# Patient Record
Sex: FEMALE | Race: WHITE | NOT HISPANIC OR LATINO | Employment: OTHER | URBAN - METROPOLITAN AREA
[De-identification: names, ages, dates, MRNs, and addresses within clinical notes are randomized per-mention and may not be internally consistent; named-entity substitution may affect disease eponyms.]

---

## 2024-07-06 ENCOUNTER — HOSPITAL ENCOUNTER (INPATIENT)
Facility: HOSPITAL | Age: 69
LOS: 3 days | Discharge: HOME | DRG: 280 | End: 2024-07-09
Attending: EMERGENCY MEDICINE | Admitting: EMERGENCY MEDICINE
Payer: MEDICARE

## 2024-07-06 DIAGNOSIS — I50.23 ACUTE ON CHRONIC SYSTOLIC CONGESTIVE HEART FAILURE: Primary | ICD-10-CM

## 2024-07-06 DIAGNOSIS — E11.65 TYPE 2 DIABETES MELLITUS WITH HYPERGLYCEMIA, UNSPECIFIED WHETHER LONG TERM INSULIN USE: ICD-10-CM

## 2024-07-06 DIAGNOSIS — E87.20 METABOLIC ACIDOSIS: ICD-10-CM

## 2024-07-06 DIAGNOSIS — R06.02 SOB (SHORTNESS OF BREATH): ICD-10-CM

## 2024-07-06 DIAGNOSIS — J96.01 ACUTE HYPOXEMIC RESPIRATORY FAILURE: ICD-10-CM

## 2024-07-06 DIAGNOSIS — N18.9 CHRONIC KIDNEY DISEASE, UNSPECIFIED CKD STAGE: ICD-10-CM

## 2024-07-06 DIAGNOSIS — R07.9 CHEST PAIN: ICD-10-CM

## 2024-07-06 LAB
ALBUMIN SERPL-MCNC: 3.4 G/DL (ref 3.4–4.8)
ALBUMIN/GLOB SERPL: 0.8 RATIO (ref 1.1–2)
ALLENS TEST: ABNORMAL
ALP SERPL-CCNC: 80 UNIT/L (ref 40–150)
ALT SERPL-CCNC: 19 UNIT/L (ref 0–55)
ANION GAP SERPL CALC-SCNC: 19 MEQ/L
AST SERPL-CCNC: 29 UNIT/L (ref 5–34)
BACTERIA #/AREA URNS AUTO: NORMAL /HPF
BASOPHILS # BLD AUTO: 0.12 X10(3)/MCL
BASOPHILS NFR BLD AUTO: 0.7 %
BILIRUB SERPL-MCNC: 0.3 MG/DL
BILIRUB UR QL STRIP.AUTO: NEGATIVE
BNP BLD-MCNC: 486.9 PG/ML
BUN SERPL-MCNC: 23 MG/DL (ref 9.8–20.1)
CALCIUM SERPL-MCNC: 9.6 MG/DL (ref 8.4–10.2)
CHLORIDE SERPL-SCNC: 103 MMOL/L (ref 98–107)
CLARITY UR: CLEAR
CO2 SERPL-SCNC: 19 MMOL/L (ref 23–31)
COLOR UR AUTO: YELLOW
CREAT SERPL-MCNC: 2.07 MG/DL (ref 0.55–1.02)
CREAT/UREA NIT SERPL: 11
D DIMER PPP IA.FEU-MCNC: 3.14 UG/ML FEU (ref 0–0.5)
DELSYS: ABNORMAL
EOSINOPHIL # BLD AUTO: 0.54 X10(3)/MCL (ref 0–0.9)
EOSINOPHIL NFR BLD AUTO: 3.1 %
EP: 6
ERYTHROCYTE [DISTWIDTH] IN BLOOD BY AUTOMATED COUNT: 12.7 % (ref 11.5–17)
ERYTHROCYTE [SEDIMENTATION RATE] IN BLOOD BY WESTERGREN METHOD: 16 MM/H
FIO2: 100
FLUAV AG UPPER RESP QL IA.RAPID: NOT DETECTED
FLUBV AG UPPER RESP QL IA.RAPID: NOT DETECTED
GFR SERPLBLD CREATININE-BSD FMLA CKD-EPI: 26 ML/MIN/1.73/M2
GLOBULIN SER-MCNC: 4.1 GM/DL (ref 2.4–3.5)
GLUCOSE SERPL-MCNC: 565 MG/DL (ref 82–115)
GLUCOSE UR QL STRIP: ABNORMAL
HCO3 UR-SCNC: 28.1 MMOL/L (ref 24–28)
HCT VFR BLD AUTO: 38.4 % (ref 37–47)
HGB BLD-MCNC: 12 G/DL (ref 12–16)
HGB UR QL STRIP: NEGATIVE
IMM GRANULOCYTES # BLD AUTO: 0.11 X10(3)/MCL (ref 0–0.04)
IMM GRANULOCYTES NFR BLD AUTO: 0.6 %
IP: 12
KETONES UR QL STRIP: NEGATIVE
LEUKOCYTE ESTERASE UR QL STRIP: NEGATIVE
LYMPHOCYTES # BLD AUTO: 7.42 X10(3)/MCL (ref 0.6–4.6)
LYMPHOCYTES NFR BLD AUTO: 43.1 %
MAGNESIUM SERPL-MCNC: 2.4 MG/DL (ref 1.6–2.6)
MCH RBC QN AUTO: 28.2 PG (ref 27–31)
MCHC RBC AUTO-ENTMCNC: 31.3 G/DL (ref 33–36)
MCV RBC AUTO: 90.1 FL (ref 80–94)
MODE: ABNORMAL
MONOCYTES # BLD AUTO: 1.26 X10(3)/MCL (ref 0.1–1.3)
MONOCYTES NFR BLD AUTO: 7.3 %
NEUTROPHILS # BLD AUTO: 7.76 X10(3)/MCL (ref 2.1–9.2)
NEUTROPHILS NFR BLD AUTO: 45.2 %
NITRITE UR QL STRIP: NEGATIVE
NRBC BLD AUTO-RTO: 0 %
PCO2 BLDA: 56.5 MMHG (ref 35–45)
PH SMN: 7.3 [PH] (ref 7.35–7.45)
PH UR STRIP: 5.5 [PH]
PLATELET # BLD AUTO: 423 X10(3)/MCL (ref 130–400)
PMV BLD AUTO: 10.5 FL (ref 7.4–10.4)
PO2 BLDA: 280 MMHG (ref 80–100)
POC BE: 2 MMOL/L
POC SATURATED O2: 100 % (ref 95–100)
POC TCO2: 30 MMOL/L (ref 23–27)
POCT GLUCOSE: 289 MG/DL (ref 70–110)
POTASSIUM SERPL-SCNC: 3.6 MMOL/L (ref 3.5–5.1)
PROT SERPL-MCNC: 7.5 GM/DL (ref 5.8–7.6)
PROT UR QL STRIP: ABNORMAL
RBC # BLD AUTO: 4.26 X10(6)/MCL (ref 4.2–5.4)
RBC #/AREA URNS AUTO: NORMAL /HPF
SAMPLE: ABNORMAL
SARS-COV-2 RNA RESP QL NAA+PROBE: NOT DETECTED
SITE: ABNORMAL
SODIUM SERPL-SCNC: 141 MMOL/L (ref 136–145)
SP GR UR STRIP.AUTO: 1.01 (ref 1–1.03)
SQUAMOUS #/AREA URNS AUTO: NORMAL /HPF
TROPONIN I SERPL-MCNC: 0.06 NG/ML (ref 0–0.04)
TROPONIN I SERPL-MCNC: 0.12 NG/ML (ref 0–0.04)
TSH SERPL-ACNC: 4.72 UIU/ML (ref 0.35–4.94)
UROBILINOGEN UR STRIP-ACNC: 0.2
WBC # BLD AUTO: 17.21 X10(3)/MCL (ref 4.5–11.5)
WBC #/AREA URNS AUTO: NORMAL /HPF

## 2024-07-06 PROCEDURE — 83735 ASSAY OF MAGNESIUM: CPT | Performed by: EMERGENCY MEDICINE

## 2024-07-06 PROCEDURE — 20000000 HC ICU ROOM

## 2024-07-06 PROCEDURE — 25000003 PHARM REV CODE 250: Performed by: INTERNAL MEDICINE

## 2024-07-06 PROCEDURE — 93010 ELECTROCARDIOGRAM REPORT: CPT | Mod: ,,, | Performed by: INTERNAL MEDICINE

## 2024-07-06 PROCEDURE — 85379 FIBRIN DEGRADATION QUANT: CPT | Performed by: EMERGENCY MEDICINE

## 2024-07-06 PROCEDURE — 93005 ELECTROCARDIOGRAM TRACING: CPT

## 2024-07-06 PROCEDURE — 5A09357 ASSISTANCE WITH RESPIRATORY VENTILATION, LESS THAN 24 CONSECUTIVE HOURS, CONTINUOUS POSITIVE AIRWAY PRESSURE: ICD-10-PCS | Performed by: EMERGENCY MEDICINE

## 2024-07-06 PROCEDURE — 36600 WITHDRAWAL OF ARTERIAL BLOOD: CPT

## 2024-07-06 PROCEDURE — 83880 ASSAY OF NATRIURETIC PEPTIDE: CPT | Performed by: EMERGENCY MEDICINE

## 2024-07-06 PROCEDURE — 27100171 HC OXYGEN HIGH FLOW UP TO 24 HOURS

## 2024-07-06 PROCEDURE — 82803 BLOOD GASES ANY COMBINATION: CPT

## 2024-07-06 PROCEDURE — 85025 COMPLETE CBC W/AUTO DIFF WBC: CPT | Performed by: EMERGENCY MEDICINE

## 2024-07-06 PROCEDURE — 25000003 PHARM REV CODE 250: Performed by: EMERGENCY MEDICINE

## 2024-07-06 PROCEDURE — 99900035 HC TECH TIME PER 15 MIN (STAT)

## 2024-07-06 PROCEDURE — 27000190 HC CPAP FULL FACE MASK W/VALVE

## 2024-07-06 PROCEDURE — 94761 N-INVAS EAR/PLS OXIMETRY MLT: CPT

## 2024-07-06 PROCEDURE — 81003 URINALYSIS AUTO W/O SCOPE: CPT | Performed by: EMERGENCY MEDICINE

## 2024-07-06 PROCEDURE — 0240U COVID/FLU A&B PCR: CPT | Performed by: EMERGENCY MEDICINE

## 2024-07-06 PROCEDURE — 84484 ASSAY OF TROPONIN QUANT: CPT | Performed by: EMERGENCY MEDICINE

## 2024-07-06 PROCEDURE — 99291 CRITICAL CARE FIRST HOUR: CPT | Mod: 25

## 2024-07-06 PROCEDURE — 80053 COMPREHEN METABOLIC PANEL: CPT | Performed by: EMERGENCY MEDICINE

## 2024-07-06 PROCEDURE — 84443 ASSAY THYROID STIM HORMONE: CPT | Performed by: EMERGENCY MEDICINE

## 2024-07-06 PROCEDURE — 81001 URINALYSIS AUTO W/SCOPE: CPT | Performed by: EMERGENCY MEDICINE

## 2024-07-06 PROCEDURE — 63600175 PHARM REV CODE 636 W HCPCS: Performed by: EMERGENCY MEDICINE

## 2024-07-06 PROCEDURE — 96374 THER/PROPH/DIAG INJ IV PUSH: CPT

## 2024-07-06 RX ORDER — ALPRAZOLAM 0.5 MG/1
0.5 TABLET ORAL 2 TIMES DAILY PRN
Status: DISCONTINUED | OUTPATIENT
Start: 2024-07-06 | End: 2024-07-09 | Stop reason: HOSPADM

## 2024-07-06 RX ORDER — FUROSEMIDE 10 MG/ML
40 INJECTION INTRAMUSCULAR; INTRAVENOUS EVERY 8 HOURS
Status: DISCONTINUED | OUTPATIENT
Start: 2024-07-07 | End: 2024-07-07

## 2024-07-06 RX ORDER — ACETAMINOPHEN 325 MG/1
650 TABLET ORAL EVERY 4 HOURS PRN
Status: DISCONTINUED | OUTPATIENT
Start: 2024-07-06 | End: 2024-07-09 | Stop reason: HOSPADM

## 2024-07-06 RX ORDER — GLIPIZIDE 5 MG/1
5 TABLET ORAL 2 TIMES DAILY
COMMUNITY
Start: 2024-05-07

## 2024-07-06 RX ORDER — ONDANSETRON HYDROCHLORIDE 2 MG/ML
4 INJECTION, SOLUTION INTRAVENOUS EVERY 8 HOURS PRN
Status: DISCONTINUED | OUTPATIENT
Start: 2024-07-06 | End: 2024-07-09 | Stop reason: HOSPADM

## 2024-07-06 RX ORDER — ZOLPIDEM TARTRATE 10 MG/1
10 TABLET ORAL NIGHTLY
COMMUNITY
Start: 2024-07-02

## 2024-07-06 RX ORDER — FENOFIBRATE 145 MG/1
145 TABLET, FILM COATED ORAL DAILY
Status: DISCONTINUED | OUTPATIENT
Start: 2024-07-07 | End: 2024-07-09 | Stop reason: HOSPADM

## 2024-07-06 RX ORDER — FUROSEMIDE 10 MG/ML
80 INJECTION INTRAMUSCULAR; INTRAVENOUS
Status: COMPLETED | OUTPATIENT
Start: 2024-07-06 | End: 2024-07-06

## 2024-07-06 RX ORDER — HYDRALAZINE HYDROCHLORIDE 25 MG/1
25 TABLET, FILM COATED ORAL 3 TIMES DAILY
Status: DISCONTINUED | OUTPATIENT
Start: 2024-07-07 | End: 2024-07-07

## 2024-07-06 RX ORDER — TALC
6 POWDER (GRAM) TOPICAL NIGHTLY PRN
Status: DISCONTINUED | OUTPATIENT
Start: 2024-07-06 | End: 2024-07-09 | Stop reason: HOSPADM

## 2024-07-06 RX ORDER — PANTOPRAZOLE SODIUM 40 MG/1
40 TABLET, DELAYED RELEASE ORAL 2 TIMES DAILY
COMMUNITY
Start: 2024-05-08

## 2024-07-06 RX ORDER — ONDANSETRON HYDROCHLORIDE 2 MG/ML
4 INJECTION, SOLUTION INTRAVENOUS EVERY 8 HOURS PRN
Status: DISCONTINUED | OUTPATIENT
Start: 2024-07-06 | End: 2024-07-07

## 2024-07-06 RX ORDER — VENLAFAXINE HYDROCHLORIDE 37.5 MG/1
37.5 CAPSULE, EXTENDED RELEASE ORAL DAILY
Status: DISCONTINUED | OUTPATIENT
Start: 2024-07-07 | End: 2024-07-09 | Stop reason: HOSPADM

## 2024-07-06 RX ORDER — INSULIN GLARGINE 100 [IU]/ML
INJECTION, SOLUTION SUBCUTANEOUS
COMMUNITY
Start: 2024-05-07

## 2024-07-06 RX ORDER — ALPRAZOLAM 0.5 MG/1
0.5 TABLET ORAL 2 TIMES DAILY PRN
COMMUNITY

## 2024-07-06 RX ORDER — FAMOTIDINE 20 MG/1
20 TABLET, FILM COATED ORAL EVERY 24 HOURS
Status: DISCONTINUED | OUTPATIENT
Start: 2024-07-07 | End: 2024-07-09 | Stop reason: HOSPADM

## 2024-07-06 RX ORDER — LISINOPRIL 20 MG/1
40 TABLET ORAL DAILY
Status: DISCONTINUED | OUTPATIENT
Start: 2024-07-07 | End: 2024-07-09 | Stop reason: HOSPADM

## 2024-07-06 RX ORDER — FENOFIBRATE 160 MG/1
160 TABLET ORAL NIGHTLY
COMMUNITY
Start: 2024-05-07

## 2024-07-06 RX ORDER — CLOTRIMAZOLE AND BETAMETHASONE DIPROPIONATE 10; .64 MG/G; MG/G
CREAM TOPICAL
COMMUNITY

## 2024-07-06 RX ORDER — LISINOPRIL 40 MG/1
40 TABLET ORAL DAILY
COMMUNITY

## 2024-07-06 RX ORDER — SODIUM CHLORIDE 0.9 % (FLUSH) 0.9 %
10 SYRINGE (ML) INJECTION
Status: DISCONTINUED | OUTPATIENT
Start: 2024-07-06 | End: 2024-07-07

## 2024-07-06 RX ORDER — GABAPENTIN 300 MG/1
600 CAPSULE ORAL 2 TIMES DAILY
Status: DISCONTINUED | OUTPATIENT
Start: 2024-07-06 | End: 2024-07-09 | Stop reason: HOSPADM

## 2024-07-06 RX ORDER — ALBUTEROL SULFATE 90 UG/1
2 AEROSOL, METERED RESPIRATORY (INHALATION) EVERY 4 HOURS PRN
COMMUNITY
Start: 2024-06-11

## 2024-07-06 RX ORDER — HYDRALAZINE HYDROCHLORIDE 25 MG/1
25 TABLET, FILM COATED ORAL 3 TIMES DAILY
COMMUNITY
Start: 2024-06-25

## 2024-07-06 RX ORDER — GLIPIZIDE 5 MG/1
5 TABLET ORAL 2 TIMES DAILY
Status: DISCONTINUED | OUTPATIENT
Start: 2024-07-06 | End: 2024-07-07

## 2024-07-06 RX ORDER — SODIUM CHLORIDE 0.9 % (FLUSH) 0.9 %
10 SYRINGE (ML) INJECTION EVERY 12 HOURS PRN
Status: DISCONTINUED | OUTPATIENT
Start: 2024-07-06 | End: 2024-07-09 | Stop reason: HOSPADM

## 2024-07-06 RX ORDER — DEXTROSE MONOHYDRATE 100 MG/ML
INJECTION, SOLUTION INTRAVENOUS
Status: DISCONTINUED | OUTPATIENT
Start: 2024-07-06 | End: 2024-07-09 | Stop reason: HOSPADM

## 2024-07-06 RX ORDER — AMLODIPINE BESYLATE 10 MG/1
10 TABLET ORAL DAILY
COMMUNITY
Start: 2024-06-22

## 2024-07-06 RX ORDER — FAMOTIDINE 20 MG/1
20 TABLET, FILM COATED ORAL 2 TIMES DAILY
COMMUNITY
Start: 2024-05-07

## 2024-07-06 RX ORDER — INSULIN ASPART 100 [IU]/ML
0-10 INJECTION, SOLUTION INTRAVENOUS; SUBCUTANEOUS
Status: DISCONTINUED | OUTPATIENT
Start: 2024-07-06 | End: 2024-07-07

## 2024-07-06 RX ORDER — GLUCAGON 1 MG
1 KIT INJECTION
Status: DISCONTINUED | OUTPATIENT
Start: 2024-07-06 | End: 2024-07-09 | Stop reason: HOSPADM

## 2024-07-06 RX ORDER — CARVEDILOL 25 MG/1
25 TABLET ORAL 2 TIMES DAILY
Status: DISCONTINUED | OUTPATIENT
Start: 2024-07-06 | End: 2024-07-09 | Stop reason: HOSPADM

## 2024-07-06 RX ORDER — ALLOPURINOL 100 MG/1
100 TABLET ORAL DAILY
Status: DISCONTINUED | OUTPATIENT
Start: 2024-07-07 | End: 2024-07-09 | Stop reason: HOSPADM

## 2024-07-06 RX ORDER — ATORVASTATIN CALCIUM 40 MG/1
40 TABLET, FILM COATED ORAL DAILY
Status: DISCONTINUED | OUTPATIENT
Start: 2024-07-07 | End: 2024-07-09 | Stop reason: HOSPADM

## 2024-07-06 RX ORDER — GABAPENTIN 600 MG/1
600 TABLET ORAL 3 TIMES DAILY
COMMUNITY
Start: 2024-05-07

## 2024-07-06 RX ORDER — ASPIRIN 81 MG/1
81 TABLET ORAL DAILY
Status: DISCONTINUED | OUTPATIENT
Start: 2024-07-07 | End: 2024-07-09 | Stop reason: HOSPADM

## 2024-07-06 RX ORDER — AMLODIPINE BESYLATE 10 MG/1
10 TABLET ORAL DAILY
Status: DISCONTINUED | OUTPATIENT
Start: 2024-07-07 | End: 2024-07-07

## 2024-07-06 RX ORDER — NALOXONE HCL 0.4 MG/ML
0.02 VIAL (ML) INJECTION
Status: DISCONTINUED | OUTPATIENT
Start: 2024-07-06 | End: 2024-07-09 | Stop reason: HOSPADM

## 2024-07-06 RX ORDER — CARVEDILOL 25 MG/1
25 TABLET ORAL 2 TIMES DAILY
COMMUNITY

## 2024-07-06 RX ORDER — ASPIRIN 81 MG/1
1 TABLET ORAL DAILY
COMMUNITY

## 2024-07-06 RX ORDER — METOCLOPRAMIDE HYDROCHLORIDE 5 MG/ML
10 INJECTION INTRAMUSCULAR; INTRAVENOUS
Status: DISCONTINUED | OUTPATIENT
Start: 2024-07-06 | End: 2024-07-06

## 2024-07-06 RX ORDER — ISOSORBIDE MONONITRATE 30 MG/1
30 TABLET, EXTENDED RELEASE ORAL
COMMUNITY
Start: 2024-05-07

## 2024-07-06 RX ORDER — SIMVASTATIN 20 MG/1
20 TABLET, FILM COATED ORAL DAILY
COMMUNITY

## 2024-07-06 RX ORDER — IBUPROFEN 200 MG
16 TABLET ORAL
Status: DISCONTINUED | OUTPATIENT
Start: 2024-07-06 | End: 2024-07-09 | Stop reason: HOSPADM

## 2024-07-06 RX ORDER — ENOXAPARIN SODIUM 100 MG/ML
30 INJECTION SUBCUTANEOUS EVERY 24 HOURS
Status: DISCONTINUED | OUTPATIENT
Start: 2024-07-06 | End: 2024-07-09 | Stop reason: HOSPADM

## 2024-07-06 RX ORDER — ISOPROPYL ALCOHOL 0.75 G/1
1 SWAB TOPICAL DAILY
COMMUNITY
Start: 2024-05-07

## 2024-07-06 RX ORDER — AMOXICILLIN 250 MG
1 CAPSULE ORAL 2 TIMES DAILY
Status: DISCONTINUED | OUTPATIENT
Start: 2024-07-06 | End: 2024-07-09 | Stop reason: HOSPADM

## 2024-07-06 RX ORDER — TRAMADOL HYDROCHLORIDE 50 MG/1
50 TABLET ORAL DAILY PRN
COMMUNITY
Start: 2024-07-02

## 2024-07-06 RX ORDER — ISOSORBIDE MONONITRATE 30 MG/1
30 TABLET, EXTENDED RELEASE ORAL DAILY
Status: DISCONTINUED | OUTPATIENT
Start: 2024-07-07 | End: 2024-07-09 | Stop reason: HOSPADM

## 2024-07-06 RX ORDER — VENLAFAXINE 37.5 MG/1
37.5 TABLET ORAL 2 TIMES DAILY
COMMUNITY

## 2024-07-06 RX ORDER — FUROSEMIDE 20 MG/1
20-40 TABLET ORAL DAILY PRN
COMMUNITY
Start: 2024-07-02

## 2024-07-06 RX ORDER — HYDROCODONE BITARTRATE AND ACETAMINOPHEN 5; 325 MG/1; MG/1
1 TABLET ORAL EVERY 6 HOURS PRN
Status: DISCONTINUED | OUTPATIENT
Start: 2024-07-06 | End: 2024-07-09 | Stop reason: HOSPADM

## 2024-07-06 RX ORDER — ENOXAPARIN SODIUM 100 MG/ML
40 INJECTION SUBCUTANEOUS EVERY 24 HOURS
Status: DISCONTINUED | OUTPATIENT
Start: 2024-07-06 | End: 2024-07-06

## 2024-07-06 RX ORDER — ALLOPURINOL 100 MG/1
100 TABLET ORAL DAILY
COMMUNITY
Start: 2024-06-22

## 2024-07-06 RX ORDER — MELOXICAM 15 MG/1
15 TABLET ORAL DAILY
COMMUNITY
Start: 2024-05-08

## 2024-07-06 RX ORDER — MUPIROCIN 20 MG/G
OINTMENT TOPICAL 2 TIMES DAILY
Status: DISCONTINUED | OUTPATIENT
Start: 2024-07-07 | End: 2024-07-09 | Stop reason: HOSPADM

## 2024-07-06 RX ADMIN — CARVEDILOL 25 MG: 25 TABLET, FILM COATED ORAL at 11:07

## 2024-07-06 RX ADMIN — GABAPENTIN 600 MG: 300 CAPSULE ORAL at 11:07

## 2024-07-06 RX ADMIN — INSULIN HUMAN 0.1 UNITS/KG/HR: 1 INJECTION, SOLUTION INTRAVENOUS at 10:07

## 2024-07-06 RX ADMIN — FUROSEMIDE 80 MG: 10 INJECTION, SOLUTION INTRAMUSCULAR; INTRAVENOUS at 08:07

## 2024-07-06 RX ADMIN — NITROGLYCERIN 1 INCH: 20 OINTMENT TOPICAL at 08:07

## 2024-07-06 NOTE — Clinical Note
Diagnosis: Acute on chronic systolic congestive heart failure [912606]   Future Attending Provider: CYNTHIA GIANG [[id number]]   Reason for IP Medical Treatment  (Clinical interventions that can only be accomplished in the IP setting? ) :: Acute respiratory failure with hypoxemia   I certify that Inpatient services for greater than or equal to 2 midnights are medically necessary:: Yes   Plans for Post-Acute care--if anticipated (pick the single best option):: A. No post acute care anticipated at this time   Special Needs:: No Special Needs [1]

## 2024-07-07 PROBLEM — I50.23 ACUTE ON CHRONIC SYSTOLIC CONGESTIVE HEART FAILURE: Status: ACTIVE | Noted: 2024-07-07

## 2024-07-07 LAB
ALBUMIN SERPL-MCNC: 2.8 G/DL (ref 3.4–4.8)
ALBUMIN/GLOB SERPL: 0.9 RATIO (ref 1.1–2)
ALP SERPL-CCNC: 67 UNIT/L (ref 40–150)
ALT SERPL-CCNC: 15 UNIT/L (ref 0–55)
ANION GAP SERPL CALC-SCNC: 12 MEQ/L
ANION GAP SERPL CALC-SCNC: 8 MEQ/L
AST SERPL-CCNC: 21 UNIT/L (ref 5–34)
BASOPHILS # BLD AUTO: 0.03 X10(3)/MCL
BASOPHILS NFR BLD AUTO: 0.3 %
BILIRUB SERPL-MCNC: 0.3 MG/DL
BUN SERPL-MCNC: 25 MG/DL (ref 9.8–20.1)
BUN SERPL-MCNC: 28 MG/DL (ref 9.8–20.1)
CALCIUM SERPL-MCNC: 9.3 MG/DL (ref 8.4–10.2)
CALCIUM SERPL-MCNC: 9.6 MG/DL (ref 8.4–10.2)
CHLORIDE SERPL-SCNC: 104 MMOL/L (ref 98–107)
CHLORIDE SERPL-SCNC: 105 MMOL/L (ref 98–107)
CO2 SERPL-SCNC: 27 MMOL/L (ref 23–31)
CO2 SERPL-SCNC: 31 MMOL/L (ref 23–31)
CREAT SERPL-MCNC: 1.69 MG/DL (ref 0.55–1.02)
CREAT SERPL-MCNC: 1.73 MG/DL (ref 0.55–1.02)
CREAT/UREA NIT SERPL: 14
CREAT/UREA NIT SERPL: 17
EOSINOPHIL # BLD AUTO: 0.16 X10(3)/MCL (ref 0–0.9)
EOSINOPHIL NFR BLD AUTO: 1.8 %
ERYTHROCYTE [DISTWIDTH] IN BLOOD BY AUTOMATED COUNT: 12.5 % (ref 11.5–17)
GFR SERPLBLD CREATININE-BSD FMLA CKD-EPI: 32 ML/MIN/1.73/M2
GFR SERPLBLD CREATININE-BSD FMLA CKD-EPI: 33 ML/MIN/1.73/M2
GLOBULIN SER-MCNC: 3 GM/DL (ref 2.4–3.5)
GLUCOSE SERPL-MCNC: 154 MG/DL (ref 82–115)
GLUCOSE SERPL-MCNC: 199 MG/DL (ref 82–115)
HCT VFR BLD AUTO: 29.9 % (ref 37–47)
HGB BLD-MCNC: 9.5 G/DL (ref 12–16)
IMM GRANULOCYTES # BLD AUTO: 0.03 X10(3)/MCL (ref 0–0.04)
IMM GRANULOCYTES NFR BLD AUTO: 0.3 %
LYMPHOCYTES # BLD AUTO: 1.84 X10(3)/MCL (ref 0.6–4.6)
LYMPHOCYTES NFR BLD AUTO: 20.8 %
MAGNESIUM SERPL-MCNC: 1.9 MG/DL (ref 1.6–2.6)
MCH RBC QN AUTO: 28 PG (ref 27–31)
MCHC RBC AUTO-ENTMCNC: 31.8 G/DL (ref 33–36)
MCV RBC AUTO: 88.2 FL (ref 80–94)
MONOCYTES # BLD AUTO: 0.5 X10(3)/MCL (ref 0.1–1.3)
MONOCYTES NFR BLD AUTO: 5.6 %
NEUTROPHILS # BLD AUTO: 6.3 X10(3)/MCL (ref 2.1–9.2)
NEUTROPHILS NFR BLD AUTO: 71.2 %
OHS QRS DURATION: 134 MS
OHS QRS DURATION: 140 MS
OHS QTC CALCULATION: 544 MS
OHS QTC CALCULATION: 549 MS
PLATELET # BLD AUTO: 254 X10(3)/MCL (ref 130–400)
PMV BLD AUTO: 9.7 FL (ref 7.4–10.4)
POCT GLUCOSE: 115 MG/DL (ref 70–110)
POCT GLUCOSE: 125 MG/DL (ref 70–110)
POCT GLUCOSE: 126 MG/DL (ref 70–110)
POCT GLUCOSE: 139 MG/DL (ref 70–110)
POCT GLUCOSE: 147 MG/DL (ref 70–110)
POCT GLUCOSE: 159 MG/DL (ref 70–110)
POCT GLUCOSE: 159 MG/DL (ref 70–110)
POCT GLUCOSE: 165 MG/DL (ref 70–110)
POCT GLUCOSE: 175 MG/DL (ref 70–110)
POTASSIUM SERPL-SCNC: 3.5 MMOL/L (ref 3.5–5.1)
POTASSIUM SERPL-SCNC: 3.6 MMOL/L (ref 3.5–5.1)
PROT SERPL-MCNC: 5.8 GM/DL (ref 5.8–7.6)
RBC # BLD AUTO: 3.39 X10(6)/MCL (ref 4.2–5.4)
SODIUM SERPL-SCNC: 143 MMOL/L (ref 136–145)
SODIUM SERPL-SCNC: 144 MMOL/L (ref 136–145)
TROPONIN I SERPL-MCNC: 0.16 NG/ML (ref 0–0.04)
TROPONIN I SERPL-MCNC: 0.23 NG/ML (ref 0–0.04)
WBC # BLD AUTO: 8.86 X10(3)/MCL (ref 4.5–11.5)

## 2024-07-07 PROCEDURE — 99900035 HC TECH TIME PER 15 MIN (STAT)

## 2024-07-07 PROCEDURE — 11000001 HC ACUTE MED/SURG PRIVATE ROOM

## 2024-07-07 PROCEDURE — 21400001 HC TELEMETRY ROOM

## 2024-07-07 PROCEDURE — 80048 BASIC METABOLIC PNL TOTAL CA: CPT | Performed by: INTERNAL MEDICINE

## 2024-07-07 PROCEDURE — 25000003 PHARM REV CODE 250: Performed by: INTERNAL MEDICINE

## 2024-07-07 PROCEDURE — 94761 N-INVAS EAR/PLS OXIMETRY MLT: CPT

## 2024-07-07 PROCEDURE — 80053 COMPREHEN METABOLIC PANEL: CPT | Performed by: INTERNAL MEDICINE

## 2024-07-07 PROCEDURE — 94660 CPAP INITIATION&MGMT: CPT

## 2024-07-07 PROCEDURE — 63600175 PHARM REV CODE 636 W HCPCS: Performed by: EMERGENCY MEDICINE

## 2024-07-07 PROCEDURE — 63600175 PHARM REV CODE 636 W HCPCS: Performed by: INTERNAL MEDICINE

## 2024-07-07 PROCEDURE — 27000190 HC CPAP FULL FACE MASK W/VALVE

## 2024-07-07 PROCEDURE — 25000003 PHARM REV CODE 250: Performed by: EMERGENCY MEDICINE

## 2024-07-07 PROCEDURE — 84484 ASSAY OF TROPONIN QUANT: CPT | Performed by: INTERNAL MEDICINE

## 2024-07-07 PROCEDURE — 85025 COMPLETE CBC W/AUTO DIFF WBC: CPT | Performed by: INTERNAL MEDICINE

## 2024-07-07 PROCEDURE — 83735 ASSAY OF MAGNESIUM: CPT | Performed by: INTERNAL MEDICINE

## 2024-07-07 PROCEDURE — 36415 COLL VENOUS BLD VENIPUNCTURE: CPT | Performed by: INTERNAL MEDICINE

## 2024-07-07 PROCEDURE — 27100171 HC OXYGEN HIGH FLOW UP TO 24 HOURS

## 2024-07-07 RX ORDER — INSULIN GLARGINE 100 [IU]/ML
40 INJECTION, SOLUTION SUBCUTANEOUS ONCE
Status: COMPLETED | OUTPATIENT
Start: 2024-07-07 | End: 2024-07-07

## 2024-07-07 RX ORDER — INSULIN ASPART 100 [IU]/ML
0-10 INJECTION, SOLUTION INTRAVENOUS; SUBCUTANEOUS EVERY 4 HOURS
Status: DISCONTINUED | OUTPATIENT
Start: 2024-07-07 | End: 2024-07-07

## 2024-07-07 RX ORDER — INSULIN ASPART 100 [IU]/ML
0-10 INJECTION, SOLUTION INTRAVENOUS; SUBCUTANEOUS EVERY 4 HOURS PRN
Status: DISCONTINUED | OUTPATIENT
Start: 2024-07-07 | End: 2024-07-09 | Stop reason: HOSPADM

## 2024-07-07 RX ORDER — DEXTROSE MONOHYDRATE, SODIUM CHLORIDE, AND POTASSIUM CHLORIDE 50; 1.49; 4.5 G/1000ML; G/1000ML; G/1000ML
INJECTION, SOLUTION INTRAVENOUS CONTINUOUS
Status: DISCONTINUED | OUTPATIENT
Start: 2024-07-07 | End: 2024-07-07

## 2024-07-07 RX ORDER — DAPAGLIFLOZIN 10 MG/1
10 TABLET, FILM COATED ORAL DAILY
Status: DISCONTINUED | OUTPATIENT
Start: 2024-07-07 | End: 2024-07-09 | Stop reason: HOSPADM

## 2024-07-07 RX ORDER — TIZANIDINE 4 MG/1
4 TABLET ORAL ONCE
Status: COMPLETED | OUTPATIENT
Start: 2024-07-07 | End: 2024-07-07

## 2024-07-07 RX ADMIN — SENNOSIDES AND DOCUSATE SODIUM 1 TABLET: 50; 8.6 TABLET ORAL at 09:07

## 2024-07-07 RX ADMIN — LISINOPRIL 40 MG: 20 TABLET ORAL at 09:07

## 2024-07-07 RX ADMIN — POTASSIUM CHLORIDE, DEXTROSE MONOHYDRATE AND SODIUM CHLORIDE: 150; 5; 450 INJECTION, SOLUTION INTRAVENOUS at 01:07

## 2024-07-07 RX ADMIN — DAPAGLIFLOZIN 10 MG: 10 TABLET, FILM COATED ORAL at 09:07

## 2024-07-07 RX ADMIN — ENOXAPARIN SODIUM 30 MG: 30 INJECTION SUBCUTANEOUS at 01:07

## 2024-07-07 RX ADMIN — MUPIROCIN 1 G: 20 OINTMENT TOPICAL at 09:07

## 2024-07-07 RX ADMIN — INSULIN ASPART 1 UNITS: 100 INJECTION, SOLUTION INTRAVENOUS; SUBCUTANEOUS at 09:07

## 2024-07-07 RX ADMIN — GABAPENTIN 600 MG: 300 CAPSULE ORAL at 09:07

## 2024-07-07 RX ADMIN — ALLOPURINOL 100 MG: 100 TABLET ORAL at 09:07

## 2024-07-07 RX ADMIN — INSULIN GLARGINE 40 UNITS: 100 INJECTION, SOLUTION SUBCUTANEOUS at 09:07

## 2024-07-07 RX ADMIN — ASPIRIN 81 MG: 81 TABLET, COATED ORAL at 09:07

## 2024-07-07 RX ADMIN — VENLAFAXINE HYDROCHLORIDE 37.5 MG: 37.5 CAPSULE, EXTENDED RELEASE ORAL at 09:07

## 2024-07-07 RX ADMIN — TIZANIDINE 4 MG: 4 TABLET ORAL at 01:07

## 2024-07-07 RX ADMIN — NITROGLYCERIN 1 INCH: 20 OINTMENT TOPICAL at 01:07

## 2024-07-07 RX ADMIN — ISOSORBIDE MONONITRATE 30 MG: 30 TABLET, EXTENDED RELEASE ORAL at 09:07

## 2024-07-07 RX ADMIN — CARVEDILOL 25 MG: 25 TABLET, FILM COATED ORAL at 09:07

## 2024-07-07 RX ADMIN — ATORVASTATIN CALCIUM 40 MG: 40 TABLET, FILM COATED ORAL at 09:07

## 2024-07-07 RX ADMIN — ENOXAPARIN SODIUM 30 MG: 30 INJECTION SUBCUTANEOUS at 04:07

## 2024-07-07 RX ADMIN — FUROSEMIDE 40 MG: 10 INJECTION, SOLUTION INTRAMUSCULAR; INTRAVENOUS at 05:07

## 2024-07-07 RX ADMIN — FENOFIBRATE 145 MG: 145 TABLET, COATED ORAL at 09:07

## 2024-07-07 RX ADMIN — FAMOTIDINE 20 MG: 20 TABLET ORAL at 01:07

## 2024-07-07 NOTE — ED PROVIDER NOTES
Encounter Date: 7/6/2024       History     Chief Complaint   Patient presents with    Shortness of Breath     Began 2 days ago with SOB and leg swelling     The history is provided by the patient and the EMS personnel.   Shortness of Breath  This is a new problem. The average episode lasts 2 days. The problem occurs continuously.The current episode started 2 days ago. The problem has been gradually worsening. Associated symptoms include orthopnea and leg swelling. Pertinent negatives include no fever, no sore throat, no cough, no chest pain and no rash. Associated medical issues include CAD and heart failure.     Review of patient's allergies indicates:   Allergen Reactions    Procaine      Other Reaction(s): Unknown    Silk Hives     Other Reaction(s): Not available, Not available     Past Medical History:   Diagnosis Date    CHF (congestive heart failure)     COPD (chronic obstructive pulmonary disease)     Diabetes mellitus     Gout, unspecified     Mixed hyperlipidemia     Sleep apnea, unspecified      No past surgical history on file.  No family history on file.     Review of Systems   Constitutional:  Negative for fever.   HENT:  Negative for sore throat.    Respiratory:  Positive for shortness of breath. Negative for cough.    Cardiovascular:  Positive for orthopnea and leg swelling. Negative for chest pain.   Gastrointestinal:  Negative for nausea.   Genitourinary:  Negative for dysuria.   Musculoskeletal:  Negative for back pain.   Skin:  Negative for rash.   Neurological:  Negative for weakness.   Hematological:  Does not bruise/bleed easily.       Physical Exam     Initial Vitals [07/06/24 2013]   BP Pulse Resp Temp SpO2   (!) 154/97 (!) 137 (!) 42 97.7 °F (36.5 °C) (!) 68 %      MAP       --         Physical Exam    Nursing note and vitals reviewed.  Constitutional: She appears well-developed and well-nourished. She appears distressed (anxious, screaming for help).   HENT:   Head: Normocephalic and  atraumatic.   Right Ear: External ear normal.   Left Ear: External ear normal.   Eyes: Conjunctivae and EOM are normal. Pupils are equal, round, and reactive to light.   Neck: Neck supple.   Normal range of motion.  Cardiovascular:  Regular rhythm, normal heart sounds and intact distal pulses.   Tachycardia present.         Pulmonary/Chest: Tachypnea noted. She is in respiratory distress. She has rales in the right middle field, the right lower field, the left middle field and the left lower field.   Abdominal: Abdomen is soft. Bowel sounds are normal.   obese   Musculoskeletal:         General: Normal range of motion.      Cervical back: Normal range of motion and neck supple.      Right lower le+ Pitting Edema present.      Left lower le+ Pitting Edema present.     Neurological: She is alert and oriented to person, place, and time. GCS score is 15. GCS eye subscore is 4. GCS verbal subscore is 5. GCS motor subscore is 6.   Skin: Skin is warm and dry. Capillary refill takes less than 2 seconds.   Psychiatric: Her behavior is normal. Judgment and thought content normal. Her mood appears anxious.         ED Course   Critical Care    Date/Time: 2024 8:10 PM    Performed by: Fidencio Sánchez MD  Authorized by: Fidencio Sánchez MD  Direct patient critical care time: 26 minutes  Additional history critical care time: 1 minutes  Ordering / reviewing critical care time: 7 minutes  Documentation critical care time: 12 minutes  Consulting other physicians critical care time: 4 minutes  Total critical care time (exclusive of procedural time) : 50 minutes  Critical care was necessary to treat or prevent imminent or life-threatening deterioration of the following conditions: respiratory failure, circulatory failure, metabolic crisis, renal failure and cardiac failure.  Critical care was time spent personally by me on the following activities: development of treatment plan with patient or surrogate,  interpretation of cardiac output measurements, evaluation of patient's response to treatment, examination of patient, obtaining history from patient or surrogate, ordering and performing treatments and interventions, ordering and review of radiographic studies, ordering and review of laboratory studies, pulse oximetry, re-evaluation of patient's condition, review of old charts and discussions with consultants.        Labs Reviewed   B-TYPE NATRIURETIC PEPTIDE - Abnormal; Notable for the following components:       Result Value    Natriuretic Peptide 486.9 (*)     All other components within normal limits   COMPREHENSIVE METABOLIC PANEL - Abnormal; Notable for the following components:    CO2 19 (*)     Glucose 565 (*)     Blood Urea Nitrogen 23.0 (*)     Creatinine 2.07 (*)     Globulin 4.1 (*)     Albumin/Globulin Ratio 0.8 (*)     All other components within normal limits   D DIMER, QUANTITATIVE - Abnormal; Notable for the following components:    D-Dimer 3.14 (*)     All other components within normal limits   TROPONIN I - Abnormal; Notable for the following components:    Troponin-I 0.056 (*)     All other components within normal limits   URINALYSIS, REFLEX TO URINE CULTURE - Abnormal; Notable for the following components:    Protein, UA 2+ (*)     Glucose, UA 3+ (*)     All other components within normal limits   CBC WITH DIFFERENTIAL - Abnormal; Notable for the following components:    WBC 17.21 (*)     MCHC 31.3 (*)     Platelet 423 (*)     MPV 10.5 (*)     Lymph # 7.42 (*)     IG# 0.11 (*)     All other components within normal limits   ISTAT PROCEDURE - Abnormal; Notable for the following components:    POC PH 7.305 (*)     POC PCO2 56.5 (*)     POC PO2 280 (*)     POC HCO3 28.1 (*)     POC TCO2 30 (*)     All other components within normal limits   COVID/FLU A&B PCR - Normal    Narrative:     The Xpert Xpress SARS-CoV-2/FLU/RSV plus is a rapid, multiplexed real-time PCR test intended for the simultaneous  qualitative detection and differentiation of SARS-CoV-2, Influenza A, Influenza B, and respiratory syncytial virus (RSV) viral RNA in either nasopharyngeal swab or nasal swab specimens.         MAGNESIUM - Normal   TSH - Normal   URINALYSIS, MICROSCOPIC - Normal   CBC W/ AUTO DIFFERENTIAL    Narrative:     The following orders were created for panel order CBC auto differential.  Procedure                               Abnormality         Status                     ---------                               -----------         ------                     CBC with Differential[[id number]]       Abnormal            Final result                 Please view results for these tests on the individual orders.     EKG Readings: (Independently Interpreted)   Initial Reading: No STEMI. DO NOT USE Rhythm: undetermined. Heart Rate: 133. Ectopy: PVCs. Conduction: LBBB. ST Segments: Non-Specific ST Segment Depression. ST Segment Depression: II, III and AVF. T Waves Flipped: II, III and AVF. [facility] ED EKG2 - Other Findings: voltage criteria for LVH with QRS widening. Clinical Impression: Left Ventricular Hypertrophy (LDH) and Movement Artifact with LBBB and with PVCs Other Impression: motion artifact limits diagnostic sensitivity; indeterminate rhythm - suspect AF with RVR       Imaging Results              X-Ray Chest AP Portable (Preliminary result)  Result time 07/06/24 20:29:48      Wet Read by Fidencio Sánchez MD (07/06/24 20:29:48, [facility] - Emergency Dept, Emergency Medicine)    CHF                                     Medications   furosemide injection 80 mg (80 mg Intravenous Given 7/6/24 2011)   nitroGLYCERIN 2% TD oint ointment 1 inch (1 inch Topical (Top) Given 7/6/24 2013)     Medical Decision Making  Amount and/or Complexity of Data Reviewed  Labs: ordered. Decision-making details documented in ED Course.  Radiology: ordered and independent interpretation performed. Decision-making details documented in  ED Course.  ECG/medicine tests: ordered and independent interpretation performed. Decision-making details documented in ED Course.    Risk  Prescription drug management.    Differential includes:  CHF, pleural effusion, infectious process, PE, myocardial ischemia, anemia, anxiety.  Will obtain CBC, CMP, BNP, troponin, mag, UA, D-dimer , CXR, EKG and give furosemide and topical NTG.  Will start BiPAP as she is markedly hypoxemic.           ED Course as of 07/06/24 2207   Sat Jul 06, 2024 2113 D-dimer elevated but renal function will not allow CTA.  Given suspected A-fib, will anticoagulate and plan on V/Q either tomorrow or Monday.  Patient with hyperglycemia and widened anion gap, concerning for DKA.  ABG ordered.  Must be cautious with IVF as she appears to volume overloaded already. [CL]   2147 Repeat EKG reveals rate of 105; there are now definite P waves visible with frequent and sometimes consecutive PVC's; LBBB remains which is chronic, per patient. [CL]   2200 I spoke with Dr. Khanna ([facility] medicine) - agrees with insulin gtt and ICU admission for continued diuresis and weaning of BiPAP [CL]      ED Course User Index  [CL] Fidencio Sánchez MD                             Clinical Impression:  Final diagnoses:  [R06.02] SOB (shortness of breath)  [I50.23] Acute on chronic systolic congestive heart failure (Primary)  [J96.01] Acute hypoxemic respiratory failure  [N18.9] Chronic kidney disease, unspecified CKD stage  [E11.65] Type 2 diabetes mellitus with hyperglycemia, unspecified whether long term insulin use  [E87.20] Metabolic acidosis          ED Disposition Condition    Admit Stable                Fidencio Sánchez MD  07/06/24 2207

## 2024-07-07 NOTE — PLAN OF CARE
Problem: Adult Inpatient Plan of Care  Goal: Plan of Care Review  Outcome: Progressing  Goal: Patient-Specific Goal (Individualized)  Outcome: Progressing  Goal: Absence of Hospital-Acquired Illness or Injury  Outcome: Progressing  Goal: Optimal Comfort and Wellbeing  Outcome: Progressing  Goal: Readiness for Transition of Care  Outcome: Progressing     Problem: Skin Injury Risk Increased  Goal: Skin Health and Integrity  Outcome: Progressing     Problem: Bariatric Environmental Safety  Goal: Safety Maintained with Care  Outcome: Progressing     Problem: Comorbidity Management  Goal: Maintenance of COPD Symptom Control  Outcome: Progressing  Goal: Maintenance of Heart Failure Symptom Control  Outcome: Progressing  Goal: Blood Pressure in Desired Range  Outcome: Progressing     Problem: Gas Exchange Impaired  Goal: Optimal Gas Exchange  Outcome: Progressing     Problem: Diabetes Comorbidity  Goal: Blood Glucose Level Within Targeted Range  Outcome: Progressing     Problem: Heart Failure  Goal: Optimal Coping  Outcome: Progressing  Goal: Optimal Cardiac Output  Outcome: Progressing  Goal: Stable Heart Rate and Rhythm  Outcome: Progressing  Goal: Optimal Functional Ability  Outcome: Progressing  Goal: Fluid and Electrolyte Balance  Outcome: Progressing  Goal: Improved Oral Intake  Outcome: Progressing  Goal: Effective Oxygenation and Ventilation  Outcome: Progressing  Goal: Effective Breathing Pattern During Sleep  Outcome: Progressing     Problem: Chronic Kidney Disease  Goal: Optimal Coping with Chronic Illness  Outcome: Progressing  Goal: Electrolyte Balance  Outcome: Progressing  Goal: Fluid Balance  Outcome: Progressing  Goal: Optimal Functional Ability  Outcome: Progressing  Goal: Absence of Anemia Signs and Symptoms  Outcome: Progressing  Goal: Optimal Oral Intake  Outcome: Progressing  Goal: Acceptable Pain Control  Outcome: Progressing  Goal: Minimize Renal Failure Effects  Outcome: Progressing     Problem:  Diabetic Ketoacidosis  Goal: Optimal Coping  Outcome: Progressing  Goal: Fluid and Electrolyte Balance with Absence of Ketosis  Outcome: Progressing

## 2024-07-07 NOTE — PROGRESS NOTES
[facility] - ICU Hospital Medicine  Progress Note    Patient Name: Keara Berry  MRN: [id number]  Patient Class: IP- Inpatient   Admission Date: 7/6/2024  Length of Stay: 1 days  Attending Physician: Perfecto Khanna Jr., MD  Primary Care Provider: Cheryl Provider        Subjective:     Principal Problem:Acute on chronic systolic congestive heart failure    Interval History:   HPI: Ms Berry presented to the emergency room with significant respiratory distress. She has a history of sleep apnea on CPAP, congestive heart failure, COPD. She is not on home oxygen. Upon arrival to ER she was in significant respiratory distress and was placed on BiPAP. Workup revealed volume overload and she was given IV furosemide and has already diuresed 700 mL. At time of exam she was on BiPAP 12/6, 50% FiO2. She denies any chest pain or nausea. She does not smoke. Initial ABG done while in ER does show chronic respiratory acidosis and a secondary metabolic acidosis.    7/7-when seen this morning on rounds was much improved; at the time was on 2 L NC with good O2 saturations; she was able to provide additional history.  She and family members were traveling from [place] back to her home in [place].  Had noted increasing shortness of breath and lower extremity edema over the prior 2 days but symptoms became markedly worse to presentation here.  She admits compliance with medications however has had issues compliant with diet while traveling.  She does have a history of CHF and review of records shows prior admission 02/2021 at which time she underwent LHC, found to have mild-to-moderate disease and EF 35-40%.  She was placed on insulin drip protocol at time of admission and maintained on DKA protocol overnight, presumably due to hyperglycemia and increased anion gap metabolic acidosis on presentation.  Serum lactic acid and acetone levels were not performed.  Negative for ketones.  Blood sugars have come down, metabolic  acidosis is resolving.  It remains unclear as to whether patient was in DKA.  Downgrade to med/surge status    Objective:     Vital Signs (Most Recent):  Temp: 97.5 °F (36.4 °C) (07/07/24 0715)  Pulse: (!) 58 (07/07/24 1023)  Resp: 17 (07/07/24 1023)  BP: (!) 118/59 (07/07/24 0930)  SpO2: (!) 93 % (07/07/24 1023) Vital Signs (24h Range):  Temp:  [97.5 °F (36.4 °C)-98.1 °F (36.7 °C)] 97.5 °F (36.4 °C)  Pulse:  [] 58  Resp:  [15-42] 17  SpO2:  [68 %-100 %] 93 %  BP: ()/() 118/59     Weight: 121.6 kg (268 lb 1.3 oz)  Body mass index is 43.27 kg/m².    Intake/Output Summary (Last 24 hours) at 7/7/2024 1054  Last data filed at 7/7/2024 0953  Gross per 24 hour   Intake 699.66 ml   Output 1300 ml   Net -600.34 ml      Physical exam  Constitution-obese, normally developed female in NAD  Eyes-PERRL, EOMI  HENT-normocephalic, atraumatic; external ears appear normal; moist mucous membranes  Neck-supple  Respiratory-normal respirations; CTA with good air movement  Heart-RRR; normal S1 and S2; no murmurs, gallops  Abdomen-soft, nontender, nondistended  Genitourinary-deferred  Musculoskeletal-no joint abnormalities, normal ROM throughout; no edema  Skin-warm, dry; no rashes  Neurologic-alert and oriented x3; nonfocal exam    Scheduled Meds:   allopurinoL  100 mg Oral Daily    aspirin  81 mg Oral Daily    atorvastatin  40 mg Oral Daily    carvediloL  25 mg Oral BID    dapagliflozin propanediol  10 mg Oral Daily    enoxparin  30 mg Subcutaneous Daily    famotidine  20 mg Oral Daily    fenofibrate  145 mg Oral Daily    gabapentin  600 mg Oral BID    isosorbide mononitrate  30 mg Oral Daily    lisinopriL  40 mg Oral Daily    mupirocin   Nasal BID    senna-docusate 8.6-50 mg  1 tablet Oral BID    venlafaxine  37.5 mg Oral Daily     Continuous Infusions:  PRN Meds:.  Current Facility-Administered Medications:     acetaminophen, 650 mg, Oral, Q4H PRN    ALPRAZolam, 0.5 mg, Oral, BID PRN    D10W, , Intravenous, PRN     "D10W, , Intravenous, PRN    dextrose 10%, 12.5 g, Intravenous, PRN    dextrose 10%, 25 g, Intravenous, PRN    dextrose 50%, 12.5 g, Intravenous, PRN    dextrose 50%, 25 g, Intravenous, PRN    glucagon (human recombinant), 1 mg, Intramuscular, PRN    glucose, 16 g, Oral, PRN    HYDROcodone-acetaminophen, 1 tablet, Oral, Q6H PRN    insulin aspart U-100, 0-10 Units, Subcutaneous, Q4H PRN    melatonin, 6 mg, Oral, Nightly PRN    naloxone, 0.02 mg, Intravenous, PRN    ondansetron, 4 mg, Intravenous, Q8H PRN    sodium chloride 0.9%, 10 mL, Intravenous, Q12H PRN    trazodone, 25 mg, Oral, Nightly PRN    Significant Labs: All pertinent labs within the past 24 hours have been reviewed.  A1C: No results for input(s): "HGBA1C" in the last 4320 hours.  ABGs:   Recent Labs   Lab 07/06/24  2130   PH 7.305*   PCO2 56.5*   HCO3 28.1*   POCSATURATED 100   BE 2   PO2 280*     CBC:   Recent Labs   Lab 07/06/24 2014 07/07/24 0624   WBC 17.21* 8.86   HGB 12.0 9.5*   HCT 38.4 29.9*   * 254     CMP:   Recent Labs   Lab 07/06/24 2014 07/07/24 0624 07/07/24  1254    143 144   K 3.6 3.6 3.5    104 105   CO2 19* 31 27   BUN 23.0* 28.0* 25.0*   CREATININE 2.07* 1.69* 1.73*   CALCIUM 9.6 9.3 9.6   ALBUMIN 3.4 2.8*  --    BILITOT 0.3 0.3  --    ALKPHOS 80 67  --    AST 29 21  --    ALT 19 15  --      Magnesium:   Recent Labs   Lab 07/06/24 2014 07/07/24  0054   MG 2.40 1.90     POCT Glucose:   Recent Labs   Lab 07/07/24  0402 07/07/24  0513 07/07/24  0559   POCTGLUCOSE 126* 159* 165*     Troponin:   Recent Labs   Lab 07/06/24  2330 07/07/24 0624 07/07/24  1254   TROPONINI 0.124* 0.230* 0.160*     Urine Studies:   Recent Labs   Lab 07/06/24  2111   COLORU Yellow   APPEARANCEUA Clear   PHUR 5.5   PROTEINUA 2+*   GLUCUA 3+*   BILIRUBINUA Negative   OCCULTUA Negative   NITRITE Negative   UROBILINOGEN 0.2   LEUKOCYTESUR Negative   RBCUA 0-2   WBCUA None Seen   BACTERIA None Seen       Significant Imaging: "   CXR  Impression:  Right hilar mass with bilateral pulmonary infiltrates.  Further assessment with chest CT recommended.    Assessment/Plan:   Acute hypoxemic respiratory failure to CHF  Resolving  Wean supplemental O2 as tolerated    Acute on chronicHFrEF  Resolving; patient clinically euvolemic at this time  Continue beta-blocker, ACE inhibitor; add SGLT2 inhibitor (Farxiga)  Consider addition of spironolactone prior to discharge  Discontinue furosemide    Metabolic acidosis elevated AG  Etiology unconfirmed; differential includes DKA, lactic acidosis, FREIDA  Resolved    Diabetes mellitus, hyperglycemia  Blood sugars much improved  Discontinue insulin drip; resume patient on basal insulin at reduced dose  Monitor blood sugars with Accu-Cheks and SSI    Acute kidney injury/probable CKD, unspecified  Patient's baseline is unknown  Renal indices have trended down from presentation values but remain elevated; she may be at baseline  Continue to monitor; avoid nephrotoxic agents as possible    Elevated troponin  Type 2 MI related to decompensated heart failure, hypoxemia    Obstructive sleep apnea  Patient admits compliance with CPAP  Continue CPAP/BiPAP nightly while in hospital    Right hilar lung mass  CT chest pending    Hypertension  Meds as noted above have been resumed/continued  Have held amlodipine and hydralazine for now    Obesity    History of gout  Continue allopurinol    Dyslipidemia  Continue statin    GI prophylaxis:  Famotidine    Code status: Full  Active Diagnoses:    Diagnosis Date Noted POA    PRINCIPAL PROBLEM:  Acute on chronic systolic congestive heart failure [I50.23] 07/07/2024 Yes      Problems Resolved During this Admission:     VTE Risk Mitigation (From admission, onward)           Ordered     enoxaparin injection 30 mg  Daily         07/06/24 9027     IP VTE HIGH RISK PATIENT  Once         07/06/24 2317     Place sequential compression device  Until discontinued         07/06/24 6176                   Patient screened for social drivers of health:  Housing instability  Transportation needs  Utility difficulties  Interpersonal safety  ---no needs identified    Critical care time: 35 minutes    Darnell Ramirez MD  Department of Hospital Medicine   [facility] - ICU

## 2024-07-07 NOTE — H&P
[facility] - ICU    History & Physical      Patient Name: Keara Berry  MRN: [id number]  Admission Date: 7/6/2024  Attending Physician:  Perfecto Khanna MD  Primary Care Provider: Peyton Luna         Patient information was obtained from patient and ER records.     Subjective:     Principal Problem: Acute on chronic diastolic CHF    Chief Complaint:   Chief Complaint   Patient presents with    Shortness of Breath     Began 2 days ago with SOB and leg swelling        HPI: Ms Berry presented to the emergency room with significant respiratory distress. She has a history of sleep apnea on CPAP, congestive heart failure, COPD. She is not on home oxygen. Upon arrival to ER she was in significant respiratory distress and was placed on BiPAP. Workup revealed volume overload and she was given IV furosemide and has already diuresed 700 mL. At time of exam she was on BiPAP 12/6, 50% FiO2. She denies any chest pain or nausea. She does not smoke. Initial ABG done while in ER does show chronic respiratory acidosis and a secondary metabolic acidosis.      Past Medical History:   Diagnosis Date    CHF (congestive heart failure)     COPD (chronic obstructive pulmonary disease)     Diabetes mellitus     Gout, unspecified     Mixed hyperlipidemia     Sleep apnea, unspecified        No past surgical history on file.    Review of patient's allergies indicates:   Allergen Reactions    Procaine      Other Reaction(s): Unknown    Silk Hives     Other Reaction(s): Not available, Not available       No current facility-administered medications on file prior to encounter.     Current Outpatient Medications on File Prior to Encounter   Medication Sig    albuterol (PROVENTIL/VENTOLIN HFA) 90 mcg/actuation inhaler Inhale 2 puffs into the lungs every 4 (four) hours as needed.    ALCOHOL PREP PAD SPACER Apply 1 each topically once daily.    allopurinoL (ZYLOPRIM) 100 MG tablet Take 100 mg by mouth once daily.     amLODIPine (NORVASC) 10 MG tablet Take 10 mg by mouth once daily.    famotidine (PEPCID) 20 MG tablet Take 20 mg by mouth 2 (two) times daily.    fenofibrate 160 MG Tab Take 160 mg by mouth every evening.    gabapentin (NEURONTIN) 600 MG tablet Take 600 mg by mouth 3 (three) times daily.    glipiZIDE (GLUCOTROL) 5 MG tablet Take 5 mg by mouth 2 (two) times daily.    hydrALAZINE (APRESOLINE) 25 MG tablet Take 25 mg by mouth 3 (three) times daily.    isosorbide mononitrate (IMDUR) 30 MG 24 hr tablet Take 30 mg by mouth.    LANTUS U-100 INSULIN 100 unit/mL injection SMARTSI Unit(s) SUB-Q Daily    LASIX 20 mg tablet Take 20-40 mg by mouth daily as needed.    meloxicam (MOBIC) 15 MG tablet Take 15 mg by mouth once daily.    pantoprazole (PROTONIX) 40 MG tablet Take 40 mg by mouth 2 (two) times daily.    traMADoL (ULTRAM) 50 mg tablet Take 50 mg by mouth daily as needed.    zolpidem (AMBIEN) 10 mg Tab Take 10 mg by mouth every evening.    ALPRAZolam (XANAX) 0.5 MG tablet Take 0.5 mg by mouth 2 (two) times daily as needed.    aspirin (ECOTRIN) 81 MG EC tablet Take 1 tablet by mouth once daily.    carvediloL (COREG) 25 MG tablet Take 25 mg by mouth 2 (two) times daily.    clotrimazole-betamethasone 1-0.05% (LOTRISONE) cream     lisinopriL (PRINIVIL,ZESTRIL) 40 MG tablet Take 40 mg by mouth once daily.    simvastatin (ZOCOR) 20 MG tablet Take 20 mg by mouth once daily.    venlafaxine (EFFEXOR) 37.5 MG Tab Take 37.5 mg by mouth 2 (two) times daily.     Family History    None       Tobacco Use    Smoking status: Not on file    Smokeless tobacco: Not on file   Substance and Sexual Activity    Alcohol use: Not on file    Drug use: Not on file    Sexual activity: Not on file     Review of Systems   Constitutional:  Negative for chills and fever.   HENT:  Negative for sinus pain and sore throat.    Eyes:  Negative for photophobia and pain.   Respiratory:  Positive for shortness of breath. Negative for  chest tightness.    Cardiovascular:  Positive for leg swelling. Negative for chest pain.   Gastrointestinal:  Negative for abdominal pain, nausea and vomiting.   Endocrine: Negative for cold intolerance and heat intolerance.   Genitourinary:  Negative for dysuria and flank pain.   Musculoskeletal:  Negative for gait problem and joint swelling.   Skin:  Negative for pallor and rash.   Allergic/Immunologic: Negative for environmental allergies and food allergies.   Neurological:  Negative for seizures and syncope.   Hematological:  Does not bruise/bleed easily.   Psychiatric/Behavioral:  Negative for agitation and confusion.      Objective:     Vital Signs (Most Recent):  Temp: 97.7 °F (36.5 °C) (07/06/24 2013)  Pulse: 101 (07/06/24 2340)  Resp: (!) 24 (07/06/24 2340)  BP: (!) 165/99 (07/06/24 2301)  SpO2: 100 % (07/06/24 2340) Vital Signs (24h Range):  Temp:  [97.7 °F (36.5 °C)] 97.7 °F (36.5 °C)  Pulse:  [] 101  Resp:  [22-42] 24  SpO2:  [68 %-100 %] 100 %  BP: (154-185)/() 165/99     Weight: 99.8 kg (220 lb)  There is no height or weight on file to calculate BMI.    Physical Exam  Constitutional:       General: She is not in acute distress.     Appearance: She is obese.   HENT:      Head: Normocephalic and atraumatic.   Eyes:      General: No scleral icterus.  Cardiovascular:      Rate and Rhythm: Normal rate and regular rhythm.   Pulmonary:      Effort: Pulmonary effort is normal. No respiratory distress.      Comments: On BiPAP 12/6, FiO2 50%  Abdominal:      General: There is no distension.      Palpations: Abdomen is soft.   Musculoskeletal:         General: Normal range of motion.      Cervical back: Normal range of motion and neck supple.      Right lower leg: Edema present.      Left lower leg: Edema present.   Skin:     General: Skin is dry.      Coloration: Skin is not jaundiced.   Neurological:      General: No focal deficit present.      Mental Status: She is alert and oriented to person,  place, and time.   Psychiatric:         Mood and Affect: Mood normal.         Behavior: Behavior normal.            Significant Labs: All pertinent labs within the past 24 hours have been reviewed.  Recent Lab Results  (Last 5 results in the past 24 hours)        07/06/24  2352   07/06/24  2330   07/06/24  2130   07/06/24  2111 07/06/24 2050        Influenza A, Molecular         Not Detected       Influenza B, Molecular         Not Detected       Allens Test     N/A           Appearance, UA       Clear         Bacteria, UA       None Seen         Bilirubin (UA)       Negative         Site     RB           Color, UA       Yellow         DelSys     CPAP/BiPAP           EP     6           FiO2     100           Glucose, UA       3+         IP     12           Ketones, UA       Negative         Leukocyte Esterase, UA       Negative         Mode     BiPAP           NITRITE UA       Negative         Blood, UA       Negative         pH, UA       5.5         POC BE     2           POC HCO3     28.1           POC PCO2     56.5           POC PH     7.305           POC PO2     280           POC SATURATED O2     100           POC TCO2     30           POCT Glucose 289               Protein, UA       2+         Rate     16           RBC, UA       0-2         Sample     ARTERIAL           SARS-CoV2 (COVID-19) Qualitative PCR         Not Detected       Specific Gravity,UA       1.015         Squam Epithel, UA       Rare         Troponin I   0.124             Urobilinogen, UA       0.2         WBC, UA       None Seen                                Significant Imaging: I have reviewed all pertinent imaging results/findings within the past 24 hours.    Assessment/Plan:     1 - Acute hypoxemic respiratory failure: wean off BiPAP and oxygen supplementation as tolerated    2 - acute on chronic diastolic heart failure: IV furosemide, oxygen protocol, continue cardiovascular medications and monitor volume status    3 - TORREY on CPAP:  CPAP are BiPAP while sleeping    4 - demand ischemia: follow serial troponin, continue current cardiovascular medications      VTE Risk Mitigation (From admission, onward)           Ordered     enoxaparin injection 30 mg  Daily         07/06/24 2317     IP VTE HIGH RISK PATIENT  Once         07/06/24 2317     Place sequential compression device  Until discontinued         07/06/24 2317     Place sequential compression device  Until discontinued         07/06/24 2212     Place sequential compression device  Until discontinued         07/06/24 2209                  60 minutes critical care time spent 7/7/24    Full code    Plan discussed with patient, she is her own decision-maker and does understand and concur with plan    Unable to assess home safety due to medical condition    This visit is via telemedicine from [place], [place]  patient is located in [place], [place]    Perfecto Khanna JR, MD  Department of Hospital Medicine   [facility] - ICU

## 2024-07-08 LAB
ANION GAP SERPL CALC-SCNC: 8 MEQ/L
BASOPHILS # BLD AUTO: 0.05 X10(3)/MCL
BASOPHILS NFR BLD AUTO: 0.6 %
BUN SERPL-MCNC: 31 MG/DL (ref 9.8–20.1)
CALCIUM SERPL-MCNC: 9.2 MG/DL (ref 8.4–10.2)
CHLORIDE SERPL-SCNC: 104 MMOL/L (ref 98–107)
CO2 SERPL-SCNC: 29 MMOL/L (ref 23–31)
CREAT SERPL-MCNC: 1.55 MG/DL (ref 0.55–1.02)
CREAT/UREA NIT SERPL: 20
EOSINOPHIL # BLD AUTO: 0.41 X10(3)/MCL (ref 0–0.9)
EOSINOPHIL NFR BLD AUTO: 5.3 %
ERYTHROCYTE [DISTWIDTH] IN BLOOD BY AUTOMATED COUNT: 12.6 % (ref 11.5–17)
EST. AVERAGE GLUCOSE BLD GHB EST-MCNC: 211.6 MG/DL
GFR SERPLBLD CREATININE-BSD FMLA CKD-EPI: 36 ML/MIN/1.73/M2
GLUCOSE SERPL-MCNC: 111 MG/DL (ref 82–115)
HBA1C MFR BLD: 9 %
HCT VFR BLD AUTO: 30.8 % (ref 37–47)
HGB BLD-MCNC: 9.7 G/DL (ref 12–16)
IMM GRANULOCYTES # BLD AUTO: 0.02 X10(3)/MCL (ref 0–0.04)
IMM GRANULOCYTES NFR BLD AUTO: 0.3 %
LYMPHOCYTES # BLD AUTO: 2.76 X10(3)/MCL (ref 0.6–4.6)
LYMPHOCYTES NFR BLD AUTO: 35.8 %
MAGNESIUM SERPL-MCNC: 2.2 MG/DL (ref 1.6–2.6)
MCH RBC QN AUTO: 27.8 PG (ref 27–31)
MCHC RBC AUTO-ENTMCNC: 31.5 G/DL (ref 33–36)
MCV RBC AUTO: 88.3 FL (ref 80–94)
MONOCYTES # BLD AUTO: 0.55 X10(3)/MCL (ref 0.1–1.3)
MONOCYTES NFR BLD AUTO: 7.1 %
NEUTROPHILS # BLD AUTO: 3.91 X10(3)/MCL (ref 2.1–9.2)
NEUTROPHILS NFR BLD AUTO: 50.9 %
PLATELET # BLD AUTO: 265 X10(3)/MCL (ref 130–400)
PMV BLD AUTO: 10.2 FL (ref 7.4–10.4)
POCT GLUCOSE: 113 MG/DL (ref 70–110)
POCT GLUCOSE: 125 MG/DL (ref 70–110)
POCT GLUCOSE: 167 MG/DL (ref 70–110)
POCT GLUCOSE: 170 MG/DL (ref 70–110)
POCT GLUCOSE: 171 MG/DL (ref 70–110)
POCT GLUCOSE: 183 MG/DL (ref 70–110)
POCT GLUCOSE: 226 MG/DL (ref 70–110)
POTASSIUM SERPL-SCNC: 3.7 MMOL/L (ref 3.5–5.1)
RBC # BLD AUTO: 3.49 X10(6)/MCL (ref 4.2–5.4)
SODIUM SERPL-SCNC: 141 MMOL/L (ref 136–145)
WBC # BLD AUTO: 7.7 X10(3)/MCL (ref 4.5–11.5)

## 2024-07-08 PROCEDURE — 99900035 HC TECH TIME PER 15 MIN (STAT)

## 2024-07-08 PROCEDURE — 27100171 HC OXYGEN HIGH FLOW UP TO 24 HOURS

## 2024-07-08 PROCEDURE — 63600175 PHARM REV CODE 636 W HCPCS: Performed by: EMERGENCY MEDICINE

## 2024-07-08 PROCEDURE — 25000003 PHARM REV CODE 250: Performed by: INTERNAL MEDICINE

## 2024-07-08 PROCEDURE — 36415 COLL VENOUS BLD VENIPUNCTURE: CPT | Performed by: EMERGENCY MEDICINE

## 2024-07-08 PROCEDURE — 80048 BASIC METABOLIC PNL TOTAL CA: CPT | Performed by: EMERGENCY MEDICINE

## 2024-07-08 PROCEDURE — 25000003 PHARM REV CODE 250: Performed by: EMERGENCY MEDICINE

## 2024-07-08 PROCEDURE — 94660 CPAP INITIATION&MGMT: CPT

## 2024-07-08 PROCEDURE — 83036 HEMOGLOBIN GLYCOSYLATED A1C: CPT | Performed by: EMERGENCY MEDICINE

## 2024-07-08 PROCEDURE — 83735 ASSAY OF MAGNESIUM: CPT | Performed by: EMERGENCY MEDICINE

## 2024-07-08 PROCEDURE — 85025 COMPLETE CBC W/AUTO DIFF WBC: CPT | Performed by: EMERGENCY MEDICINE

## 2024-07-08 PROCEDURE — 21400001 HC TELEMETRY ROOM

## 2024-07-08 PROCEDURE — 94761 N-INVAS EAR/PLS OXIMETRY MLT: CPT

## 2024-07-08 RX ORDER — POTASSIUM CHLORIDE 20 MEQ/1
40 TABLET, EXTENDED RELEASE ORAL ONCE
Status: COMPLETED | OUTPATIENT
Start: 2024-07-08 | End: 2024-07-08

## 2024-07-08 RX ORDER — FUROSEMIDE 10 MG/ML
40 INJECTION INTRAMUSCULAR; INTRAVENOUS ONCE
Status: COMPLETED | OUTPATIENT
Start: 2024-07-08 | End: 2024-07-08

## 2024-07-08 RX ORDER — SPIRONOLACTONE 25 MG/1
25 TABLET ORAL DAILY
Status: DISCONTINUED | OUTPATIENT
Start: 2024-07-08 | End: 2024-07-09 | Stop reason: HOSPADM

## 2024-07-08 RX ADMIN — INSULIN ASPART 2 UNITS: 100 INJECTION, SOLUTION INTRAVENOUS; SUBCUTANEOUS at 09:07

## 2024-07-08 RX ADMIN — Medication 6 MG: at 08:07

## 2024-07-08 RX ADMIN — FAMOTIDINE 20 MG: 20 TABLET ORAL at 09:07

## 2024-07-08 RX ADMIN — INSULIN ASPART 2 UNITS: 100 INJECTION, SOLUTION INTRAVENOUS; SUBCUTANEOUS at 11:07

## 2024-07-08 RX ADMIN — ATORVASTATIN CALCIUM 40 MG: 40 TABLET, FILM COATED ORAL at 09:07

## 2024-07-08 RX ADMIN — SPIRONOLACTONE 25 MG: 25 TABLET ORAL at 11:07

## 2024-07-08 RX ADMIN — MUPIROCIN 1 G: 20 OINTMENT TOPICAL at 09:07

## 2024-07-08 RX ADMIN — FUROSEMIDE 40 MG: 10 INJECTION, SOLUTION INTRAMUSCULAR; INTRAVENOUS at 09:07

## 2024-07-08 RX ADMIN — ALLOPURINOL 100 MG: 100 TABLET ORAL at 09:07

## 2024-07-08 RX ADMIN — GABAPENTIN 600 MG: 300 CAPSULE ORAL at 09:07

## 2024-07-08 RX ADMIN — MUPIROCIN 2 G: 20 OINTMENT TOPICAL at 08:07

## 2024-07-08 RX ADMIN — DAPAGLIFLOZIN 10 MG: 10 TABLET, FILM COATED ORAL at 09:07

## 2024-07-08 RX ADMIN — ISOSORBIDE MONONITRATE 30 MG: 30 TABLET, EXTENDED RELEASE ORAL at 09:07

## 2024-07-08 RX ADMIN — INSULIN ASPART 2 UNITS: 100 INJECTION, SOLUTION INTRAVENOUS; SUBCUTANEOUS at 04:07

## 2024-07-08 RX ADMIN — ASPIRIN 81 MG: 81 TABLET, COATED ORAL at 09:07

## 2024-07-08 RX ADMIN — CARVEDILOL 25 MG: 25 TABLET, FILM COATED ORAL at 09:07

## 2024-07-08 RX ADMIN — FENOFIBRATE 145 MG: 145 TABLET, COATED ORAL at 09:07

## 2024-07-08 RX ADMIN — GABAPENTIN 600 MG: 300 CAPSULE ORAL at 08:07

## 2024-07-08 RX ADMIN — ENOXAPARIN SODIUM 30 MG: 30 INJECTION SUBCUTANEOUS at 04:07

## 2024-07-08 RX ADMIN — POTASSIUM CHLORIDE 40 MEQ: 1500 TABLET, EXTENDED RELEASE ORAL at 09:07

## 2024-07-08 RX ADMIN — VENLAFAXINE HYDROCHLORIDE 37.5 MG: 37.5 CAPSULE, EXTENDED RELEASE ORAL at 09:07

## 2024-07-08 RX ADMIN — LISINOPRIL 40 MG: 20 TABLET ORAL at 09:07

## 2024-07-08 RX ADMIN — SENNOSIDES AND DOCUSATE SODIUM 1 TABLET: 50; 8.6 TABLET ORAL at 08:07

## 2024-07-08 RX ADMIN — SENNOSIDES AND DOCUSATE SODIUM 1 TABLET: 50; 8.6 TABLET ORAL at 09:07

## 2024-07-08 NOTE — PLAN OF CARE
Called relative in [place], on contact list and informed her that pt will be discharged home tomorrow.  She said that she lives 13 hrs away, and that her son lives 3 1/2 hrs away and that he is closer and he needs to pick pt up.  Called son and informed him that his mother is discharging tomorrow and that he needs to make arrangements to come and pick her up.  He said that he has to try and get off work, find someone to take care of his dogs and also check and see if he can drive here.  I told him that I did call his relative and I was told that he needs to come and pick her up, because he lives closer.  He said that he will come to get her as soon as the weather allows.  I then told him that it does not matter how late he gets here.  He stated that he will try.

## 2024-07-08 NOTE — PLAN OF CARE
Problem: Adult Inpatient Plan of Care  Goal: Plan of Care Review  Outcome: Progressing  Goal: Patient-Specific Goal (Individualized)  Outcome: Progressing  Goal: Absence of Hospital-Acquired Illness or Injury  Outcome: Progressing  Goal: Optimal Comfort and Wellbeing  Outcome: Progressing  Goal: Readiness for Transition of Care  Outcome: Progressing     Problem: Skin Injury Risk Increased  Goal: Skin Health and Integrity  Outcome: Progressing     Problem: Comorbidity Management  Goal: Maintenance of COPD Symptom Control  Outcome: Progressing  Goal: Maintenance of Heart Failure Symptom Control  Outcome: Progressing  Goal: Blood Pressure in Desired Range  Outcome: Progressing     Problem: Gas Exchange Impaired  Goal: Optimal Gas Exchange  Outcome: Progressing     Problem: Diabetes Comorbidity  Goal: Blood Glucose Level Within Targeted Range  Outcome: Progressing     Problem: Heart Failure  Goal: Optimal Coping  Outcome: Progressing  Goal: Optimal Cardiac Output  Outcome: Progressing  Goal: Stable Heart Rate and Rhythm  Outcome: Progressing  Goal: Optimal Functional Ability  Outcome: Progressing  Goal: Fluid and Electrolyte Balance  Outcome: Progressing  Goal: Improved Oral Intake  Outcome: Progressing  Goal: Effective Oxygenation and Ventilation  Outcome: Progressing  Goal: Effective Breathing Pattern During Sleep  Outcome: Progressing     Problem: Obstructive Sleep Apnea Risk or Actual  Goal: Unobstructed Breathing During Sleep  Outcome: Progressing

## 2024-07-08 NOTE — PROGRESS NOTES
[facility] - ICU Hospital Medicine  Progress Note    Patient Name: Keara Berry  MRN: [id number]  Patient Class: IP- Inpatient   Admission Date: 7/6/2024  Length of Stay: 2 days  Attending Physician: Darnell Ramirez MD  Primary Care Provider: Cheryl Provider        Subjective:     Principal Problem:Acute on chronic systolic congestive heart failure    Interval History:   HPI: Ms Berry presented to the emergency room with significant respiratory distress. She has a history of sleep apnea on CPAP, congestive heart failure, COPD. She is not on home oxygen. Upon arrival to ER she was in significant respiratory distress and was placed on BiPAP. Workup revealed volume overload and she was given IV furosemide and has already diuresed 700 mL. At time of exam she was on BiPAP 12/6, 50% FiO2. She denies any chest pain or nausea. She does not smoke. Initial ABG done while in ER does show chronic respiratory acidosis and a secondary metabolic acidosis.    7/7-when seen this morning on rounds was much improved; at the time was on 2 L NC with good O2 saturations; she was able to provide additional history.  She and family members were traveling from [place] back to her home in [place].  Had noted increasing shortness of breath and lower extremity edema over the prior 2 days but symptoms became markedly worse to presentation here.  She admits compliance with medications however has had issues compliant with diet while traveling.  She does have a history of CHF and review of records shows prior admission 02/2021 at which time she underwent LHC, found to have mild-to-moderate disease and EF 35-40%.  She was placed on insulin drip protocol at time of admission and maintained on DKA protocol overnight, presumably due to hyperglycemia and increased anion gap metabolic acidosis on presentation.  Serum lactic acid and acetone levels were not performed.  Negative for ketones.  Blood sugars have come down,  metabolic acidosis is resolving.  It remains unclear as to whether patient was in DKA.  Downgrade to med/surge status    7/8-she has continued to do well; respiratory status is at baseline; O2 saturations on room air are normal; sugars are within normal range; I have encouraged her to ambulate in the room    Objective:     Vital Signs (Most Recent):  Temp: 98.3 °F (36.8 °C) (07/08/24 0729)  Pulse: 82 (07/08/24 0729)  Resp: 18 (07/08/24 0430)  BP: (!) 133/46 (07/08/24 0729)  SpO2: 97 % (07/08/24 0729) Vital Signs (24h Range):  Temp:  [96.8 °F (36 °C)-99.2 °F (37.3 °C)] 98.3 °F (36.8 °C)  Pulse:  [55-97] 82  Resp:  [16-25] 18  SpO2:  [91 %-100 %] 97 %  BP: (109-137)/(46-80) 133/46     Weight: 121.6 kg (268 lb 1.3 oz)  Body mass index is 43.27 kg/m².    Intake/Output Summary (Last 24 hours) at 7/8/2024 1049  Last data filed at 7/8/2024 0943  Gross per 24 hour   Intake 1022 ml   Output 1800 ml   Net -778 ml      Physical exam  Constitution-obese, normally developed female in NAD  Eyes-PERRL, EOMI  HENT-normocephalic, atraumatic  Neck-supple  Respiratory-normal respirations; CTA with good air movement  Heart-RRR; normal S1 and S2; no murmurs, gallops  Abdomen-soft, nontender, nondistended  Genitourinary-deferred  Musculoskeletal-no joint abnormalities, normal ROM throughout; no edema  Skin-warm, dry; no rashes  Neurologic-alert and oriented x3; nonfocal exam    Scheduled Meds:   allopurinoL  100 mg Oral Daily    aspirin  81 mg Oral Daily    atorvastatin  40 mg Oral Daily    carvediloL  25 mg Oral BID    dapagliflozin propanediol  10 mg Oral Daily    enoxparin  30 mg Subcutaneous Daily    famotidine  20 mg Oral Daily    fenofibrate  145 mg Oral Daily    gabapentin  600 mg Oral BID    isosorbide mononitrate  30 mg Oral Daily    lisinopriL  40 mg Oral Daily    mupirocin   Nasal BID    senna-docusate 8.6-50 mg  1 tablet Oral BID    venlafaxine  37.5 mg Oral Daily     Continuous Infusions:  PRN Meds:.  Current  Facility-Administered Medications:     acetaminophen, 650 mg, Oral, Q4H PRN    ALPRAZolam, 0.5 mg, Oral, BID PRN    D10W, , Intravenous, PRN    D10W, , Intravenous, PRN    dextrose 10%, 12.5 g, Intravenous, PRN    dextrose 10%, 25 g, Intravenous, PRN    dextrose 50%, 12.5 g, Intravenous, PRN    dextrose 50%, 25 g, Intravenous, PRN    glucagon (human recombinant), 1 mg, Intramuscular, PRN    glucose, 16 g, Oral, PRN    HYDROcodone-acetaminophen, 1 tablet, Oral, Q6H PRN    insulin aspart U-100, 0-10 Units, Subcutaneous, Q4H PRN    melatonin, 6 mg, Oral, Nightly PRN    naloxone, 0.02 mg, Intravenous, PRN    ondansetron, 4 mg, Intravenous, Q8H PRN    sodium chloride 0.9%, 10 mL, Intravenous, Q12H PRN    trazodone, 25 mg, Oral, Nightly PRN    Significant Labs: All pertinent labs within the past 24 hours have been reviewed.  A1C:   Recent Labs   Lab 07/08/24  0521   HGBA1C 9.0*     ABGs:   Recent Labs   Lab 07/06/24  2130   PH 7.305*   PCO2 56.5*   HCO3 28.1*   POCSATURATED 100   BE 2   PO2 280*     CBC:   Recent Labs   Lab 07/06/24 2014 07/07/24  0624 07/08/24  0521   WBC 17.21* 8.86 7.70   HGB 12.0 9.5* 9.7*   HCT 38.4 29.9* 30.8*   * 254 265     CMP:   Recent Labs   Lab 07/06/24 2014 07/07/24 0624 07/07/24  1254 07/08/24  0521    143 144 141   K 3.6 3.6 3.5 3.7    104 105 104   CO2 19* 31 27 29   BUN 23.0* 28.0* 25.0* 31.0*   CREATININE 2.07* 1.69* 1.73* 1.55*   CALCIUM 9.6 9.3 9.6 9.2   ALBUMIN 3.4 2.8*  --   --    BILITOT 0.3 0.3  --   --    ALKPHOS 80 67  --   --    AST 29 21  --   --    ALT 19 15  --   --      Magnesium:   Recent Labs   Lab 07/06/24  2014 07/07/24  0054 07/08/24  0521   MG 2.40 1.90 2.20     POCT Glucose:   Recent Labs   Lab 07/08/24  0020 07/08/24  0503 07/08/24  0956   POCTGLUCOSE 125* 113* 170*     Troponin:   Recent Labs   Lab 07/06/24  2330 07/07/24  0624 07/07/24  1254   TROPONINI 0.124* 0.230* 0.160*     Urine Studies:   Recent Labs   Lab 07/06/24  2111   COLORU Yellow    APPEARANCEUA Clear   PHUR 5.5   PROTEINUA 2+*   GLUCUA 3+*   BILIRUBINUA Negative   OCCULTUA Negative   NITRITE Negative   UROBILINOGEN 0.2   LEUKOCYTESUR Negative   RBCUA 0-2   WBCUA None Seen   BACTERIA None Seen       Significant Imaging:   CXR  Impression:  Right hilar mass with bilateral pulmonary infiltrates.  Further assessment with chest CT recommended.    CT chest  Impression:  1. Patchy ground-glass and nodular opacities in the right upper lobe which may reflect edema or a small airways infectious or inflammatory process.  2. Interlobular septal thickening in both lungs compatible with pulmonary edema.  3. Bilateral small pleural effusions.  Dependent opacities in both lower lobes likely reflect atelectasis with infection not excluded.  4. Status post cholecystectomy.  Small hyperattenuating focus at the hepatic hilum within area of fluid attenuation could reflect a stone within the cystic duct remnant.    Assessment/Plan:   Acute hypoxemic respiratory failure due to CHF  Resolved    Acute on chronicHFrEF  Resolved patient clinically euvolemic at this time  Continue beta-blocker, ACE inhibitor; add SGLT2 inhibitor (Farxiga)  Add Spironolactone  Will give additional dose of IV furosemide this am    Metabolic acidosis, elevated AG  Etiology unconfirmed; differential includes DKA, lactic acidosis, FREIDA  Resolved    Diabetes mellitus, hyperglycemia  Blood sugars nl  Cont current approach  Monitor blood sugars with Accu-Cheks and SSI    Acute kidney injury/probable CKD, unspecified  Patient's baseline is unknown  Cr has cont'd to trend down; slightly more azotemic today, poss due to diuresis  Continue to monitor; avoid nephrotoxic agents as possible    Elevated troponin  Type 2 MI related to decompensated heart failure, hypoxemia    Obstructive sleep apnea  Patient admits compliance with CPAP  Continue CPAP/BiPAP nightly while in hospital    Right hilar lung mass on CXR  CT chest shows no lung  mass    Hypertension  Meds as noted above have been resumed/continued  Have held amlodipine and hydralazine for now  BP remains acceptable    Obesity    History of gout  Continue allopurinol    Dyslipidemia  Continue statin    GI prophylaxis:  Famotidine    Code status: Full  Active Diagnoses:    Diagnosis Date Noted POA    PRINCIPAL PROBLEM:  Acute on chronic systolic congestive heart failure [I50.23] 07/07/2024 Yes      Problems Resolved During this Admission:     VTE Risk Mitigation (From admission, onward)           Ordered     enoxaparin injection 30 mg  Daily         07/06/24 2317     IP VTE HIGH RISK PATIENT  Once         07/06/24 2317     Place sequential compression device  Until discontinued         07/06/24 2212                  Patient screened for social drivers of health:  Housing instability  Transportation needs  Utility difficulties  Interpersonal safety  ---no needs identified    Darnell Ramirez MD  Department of Hospital Medicine   [facility] - ICU

## 2024-07-08 NOTE — PLAN OF CARE
Problem: Adult Inpatient Plan of Care  Goal: Plan of Care Review  Outcome: Progressing  Goal: Patient-Specific Goal (Individualized)  Outcome: Progressing  Goal: Absence of Hospital-Acquired Illness or Injury  Outcome: Progressing  Goal: Optimal Comfort and Wellbeing  Outcome: Progressing  Goal: Readiness for Transition of Care  Outcome: Progressing     Problem: Skin Injury Risk Increased  Goal: Skin Health and Integrity  Outcome: Progressing     Problem: Bariatric Environmental Safety  Goal: Safety Maintained with Care  Outcome: Progressing     Problem: Comorbidity Management  Goal: Maintenance of COPD Symptom Control  Outcome: Progressing  Goal: Maintenance of Heart Failure Symptom Control  Outcome: Progressing  Goal: Blood Pressure in Desired Range  Outcome: Progressing     Problem: Gas Exchange Impaired  Goal: Optimal Gas Exchange  Outcome: Progressing     Problem: Diabetes Comorbidity  Goal: Blood Glucose Level Within Targeted Range  Outcome: Progressing     Problem: Heart Failure  Goal: Optimal Coping  Outcome: Progressing  Goal: Optimal Cardiac Output  Outcome: Progressing  Goal: Stable Heart Rate and Rhythm  Outcome: Progressing  Goal: Optimal Functional Ability  Outcome: Progressing  Goal: Fluid and Electrolyte Balance  Outcome: Progressing  Goal: Improved Oral Intake  Outcome: Progressing  Goal: Effective Oxygenation and Ventilation  Outcome: Progressing  Goal: Effective Breathing Pattern During Sleep  Outcome: Progressing     Problem: Chronic Kidney Disease  Goal: Optimal Coping with Chronic Illness  Outcome: Progressing  Goal: Electrolyte Balance  Outcome: Progressing  Goal: Fluid Balance  Outcome: Progressing  Goal: Optimal Functional Ability  Outcome: Progressing  Goal: Absence of Anemia Signs and Symptoms  Outcome: Progressing  Goal: Optimal Oral Intake  Outcome: Progressing  Goal: Acceptable Pain Control  Outcome: Progressing  Goal: Minimize Renal Failure Effects  Outcome: Progressing     Problem:  Diabetic Ketoacidosis  Goal: Optimal Coping  Outcome: Progressing  Goal: Fluid and Electrolyte Balance with Absence of Ketosis  Outcome: Progressing     Problem: Obstructive Sleep Apnea Risk or Actual  Goal: Unobstructed Breathing During Sleep  Outcome: Progressing

## 2024-07-08 NOTE — PROGRESS NOTES
"Inpatient Nutrition Evaluation    Admit Date: 7/6/2024   Total duration of encounter: 2 days    Nutrition Recommendation/Prescription     Continue 1800 Calorie Diabetic Diet as tolerated.   Monitor Renal indices and the need for adding renal restriction to current diet order.   Monitor intake, weight, and labs.     RD following and available as needed. Thank you.     Nutrition Assessment     Chart Review    Reason Seen: continuous nutrition monitoring    Malnutrition Screening Tool Results   Have you recently lost weight without trying?: No  Have you been eating poorly because of a decreased appetite?: No   MST Score: 0     Diagnosis:  Acute on Chronic Systolic Congestive Heart Failure.     Relevant Medical History:   CHF (congestive heart failure)      COPD (chronic obstructive pulmonary disease)      Diabetes mellitus      Gout, unspecified      Mixed hyperlipidemia      Sleep apnea, unspecified          Nutrition-Related Medications:   Lovenox; Insulin; Senna-docusate; Spironolactone.     Nutrition-Related Labs:  7/8: Hgb A1C 9.0; H/H 9.7/30.8(L); BUN/Crea 31.0/1.55; GFR 36(L).     Diet Order: Diet diabetic 1800 Calorie  Oral Supplement Order: none  Appetite/Oral Intake: good/% of meals  Factors Affecting Nutritional Intake: none identified  Food/Mormonism/Cultural Preferences: none reported  Food Allergies: none reported       Wound(s):       Comments    7/8: Pt with good appetite and intake. Consuming % of meals. No recent weight loss noted/reported. Labs and meds reviewed. Renal indices concerning. Will monitor and continue to monitor during stay.      Anthropometrics    Height: 5' 6" (167.6 cm) Height Method: Stated  Last Weight: 121.6 kg (268 lb 1.3 oz) (07/07/24 0203) Weight Method: Bed Scale  BMI (Calculated): 43.3  BMI Classification: obese grade III (BMI >/=40)     Ideal Body Weight (IBW), Female: 130 lb     % Ideal Body Weight, Female (lb): 206.22 %                             Usual Weight " Provided By: unable to obtain usual weight    Wt Readings from Last 5 Encounters:   07/07/24 121.6 kg (268 lb 1.3 oz)     Weight Change(s) Since Admission:  Admit Weight: 99.8 kg (220 lb) (07/06/24 2013)      Patient Education    Not applicable.    Monitoring & Evaluation     Dietitian will monitor food and beverage intake, energy intake, weight, weight change, electrolyte/renal panel, glucose/endocrine profile, and gastrointestinal profile.  Nutrition Risk/Follow-Up: low (follow-up in 5-7 days)  Patients assigned 'low nutrition risk' status do not qualify for a full nutritional assessment but will be monitored and re-evaluated in a 5-7 day time period. Please consult if re-evaluation needed sooner.

## 2024-07-08 NOTE — PLAN OF CARE
07/08/24 1540   Discharge Assessment   Assessment Type Discharge Planning Assessment   Source of Information patient   People in Home alone   Do you expect to return to your current living situation? Yes   Do you have help at home or someone to help you manage your care at home? Yes   Who are your caregiver(s) and their phone number(s)? fly   Prior to hospitilization cognitive status: Alert/Oriented;No Deficits   Current cognitive status: Alert/Oriented;No Deficits   Equipment Currently Used at Home glucometer   Do you currently have service(s) that help you manage your care at home? No   Do you take prescription medications? Yes   Do you have prescription coverage? Yes   Do you have any problems affording any of your prescribed medications? No   Is the patient taking medications as prescribed? yes   Who is going to help you get home at discharge? fly   How do you get to doctors appointments? family or friend will provide   Are you on dialysis? No   Do you take coumadin? No   Discharge Plan A Home with family   Discharge Plan B Home with family   DME Needed Upon Discharge  none   Discharge Plan discussed with: Patient;Adult children   Transition of Care Barriers None   Physical Activity   On average, how many days per week do you engage in moderate to strenuous exercise (like a brisk walk)? Pt Declined   On average, how many minutes do you engage in exercise at this level? Pt Declined   Financial Resource Strain   How hard is it for you to pay for the very basics like food, housing, medical care, and heating? Pt Declined   Housing Stability   In the last 12 months, was there a time when you were not able to pay the mortgage or rent on time? Pt Declined   At any time in the past 12 months, were you homeless or living in a shelter (including now)? Pt Declined   Transportation Needs   Has the lack of transportation kept you from medical appointments, meetings, work or from getting things needed for daily living?  Patient declined   Food Insecurity   Within the past 12 months, you worried that your food would run out before you got the money to buy more. Pt Declined   Within the past 12 months, the food you bought just didn't last and you didn't have money to get more. Pt Declined   Stress   Do you feel stress - tense, restless, nervous, or anxious, or unable to sleep at night because your mind is troubled all the time - these days? Pt Declined   Social Isolation   How often do you feel lonely or isolated from those around you?  Patient declined   Alcohol Use   Q1: How often do you have a drink containing alcohol? Pt Declined   Q2: How many drinks containing alcohol do you have on a typical day when you are drinking? Pt Declined   Q3: How often do you have six or more drinks on one occasion? Pt Declined   Utilities   In the past 12 months has the electric, gas, oil, or water company threatened to shut off services in your home? Pt Declined   Health Literacy   How often do you need to have someone help you when you read instructions, pamphlets, or other written material from your doctor or pharmacy? Patient declines to respond

## 2024-07-08 NOTE — PLAN OF CARE
Problem: Adult Inpatient Plan of Care  Goal: Plan of Care Review  7/8/2024 0117 by Gino Townsend RN  Outcome: Progressing  7/8/2024 0035 by Gino Townsend RN  Outcome: Progressing  Goal: Patient-Specific Goal (Individualized)  7/8/2024 0117 by Gino Townsend RN  Outcome: Progressing  7/8/2024 0035 by Gino Townsend, RN  Outcome: Progressing  Goal: Absence of Hospital-Acquired Illness or Injury  7/8/2024 0117 by Gino Townsend, MICHAEL  Outcome: Progressing  7/8/2024 0035 by Gino Townsend, MICHAEL  Outcome: Progressing  Goal: Optimal Comfort and Wellbeing  7/8/2024 0117 by Gino Townsend RN  Outcome: Progressing  7/8/2024 0035 by Gino Townsend, MICHAEL  Outcome: Progressing  Goal: Readiness for Transition of Care  7/8/2024 0117 by Gino Townsend, MICHAEL  Outcome: Progressing  7/8/2024 0035 by Gino Townsend, MICHAEL  Outcome: Progressing     Problem: Skin Injury Risk Increased  Goal: Skin Health and Integrity  7/8/2024 0117 by Gino Townsend, MICHAEL  Outcome: Progressing  7/8/2024 0035 by Gino Townsend, MICHAEL  Outcome: Progressing     Problem: Bariatric Environmental Safety  Goal: Safety Maintained with Care  7/8/2024 0117 by Gino Townsend, MICHAEL  Outcome: Progressing  7/8/2024 0035 by Gino Townsend, MICHAEL  Outcome: Progressing     Problem: Comorbidity Management  Goal: Maintenance of COPD Symptom Control  7/8/2024 0117 by Gino Townsend, MICHAEL  Outcome: Progressing  7/8/2024 0035 by Gino Townsend, RN  Outcome: Progressing  Goal: Maintenance of Heart Failure Symptom Control  7/8/2024 0117 by Gino Townsend, MICHAEL  Outcome: Progressing  7/8/2024 0035 by Gino Townsend, RN  Outcome: Progressing  Goal: Blood Pressure in Desired Range  7/8/2024 0117 by Gino Townsend, MICHAEL  Outcome: Progressing  7/8/2024 0035 by Gino Townsend, RN  Outcome: Progressing     Problem: Gas Exchange Impaired  Goal: Optimal Gas Exchange  7/8/2024 0117 by Gino Townsend, RN  Outcome: Progressing  7/8/2024 0035 by Gino Townsend, RN  Outcome: Progressing

## 2024-07-09 VITALS
OXYGEN SATURATION: 91 % | WEIGHT: 261.63 LBS | DIASTOLIC BLOOD PRESSURE: 69 MMHG | RESPIRATION RATE: 20 BRPM | HEART RATE: 62 BPM | SYSTOLIC BLOOD PRESSURE: 137 MMHG | TEMPERATURE: 98 F | HEIGHT: 66 IN | BODY MASS INDEX: 42.05 KG/M2

## 2024-07-09 LAB
ANION GAP SERPL CALC-SCNC: 8 MEQ/L
BUN SERPL-MCNC: 31 MG/DL (ref 9.8–20.1)
CALCIUM SERPL-MCNC: 9.5 MG/DL (ref 8.4–10.2)
CHLORIDE SERPL-SCNC: 102 MMOL/L (ref 98–107)
CO2 SERPL-SCNC: 30 MMOL/L (ref 23–31)
CREAT SERPL-MCNC: 1.53 MG/DL (ref 0.55–1.02)
CREAT/UREA NIT SERPL: 20
GFR SERPLBLD CREATININE-BSD FMLA CKD-EPI: 37 ML/MIN/1.73/M2
GLUCOSE SERPL-MCNC: 182 MG/DL (ref 82–115)
MAGNESIUM SERPL-MCNC: 2.1 MG/DL (ref 1.6–2.6)
POCT GLUCOSE: 175 MG/DL (ref 70–110)
POTASSIUM SERPL-SCNC: 3.9 MMOL/L (ref 3.5–5.1)
SODIUM SERPL-SCNC: 140 MMOL/L (ref 136–145)

## 2024-07-09 PROCEDURE — 36415 COLL VENOUS BLD VENIPUNCTURE: CPT | Performed by: EMERGENCY MEDICINE

## 2024-07-09 PROCEDURE — 94761 N-INVAS EAR/PLS OXIMETRY MLT: CPT

## 2024-07-09 PROCEDURE — 25000003 PHARM REV CODE 250: Performed by: EMERGENCY MEDICINE

## 2024-07-09 PROCEDURE — 80048 BASIC METABOLIC PNL TOTAL CA: CPT | Performed by: EMERGENCY MEDICINE

## 2024-07-09 PROCEDURE — 99900035 HC TECH TIME PER 15 MIN (STAT)

## 2024-07-09 PROCEDURE — 83735 ASSAY OF MAGNESIUM: CPT | Performed by: EMERGENCY MEDICINE

## 2024-07-09 PROCEDURE — 27100171 HC OXYGEN HIGH FLOW UP TO 24 HOURS

## 2024-07-09 PROCEDURE — 25000003 PHARM REV CODE 250: Performed by: INTERNAL MEDICINE

## 2024-07-09 PROCEDURE — 94660 CPAP INITIATION&MGMT: CPT

## 2024-07-09 RX ORDER — SPIRONOLACTONE 25 MG/1
25 TABLET ORAL DAILY
Qty: 30 TABLET | Refills: 11 | Status: SHIPPED | OUTPATIENT
Start: 2024-07-10 | End: 2025-07-10

## 2024-07-09 RX ADMIN — HYDROCODONE BITARTRATE AND ACETAMINOPHEN 1 TABLET: 5; 325 TABLET ORAL at 09:07

## 2024-07-09 RX ADMIN — CARVEDILOL 25 MG: 25 TABLET, FILM COATED ORAL at 09:07

## 2024-07-09 RX ADMIN — GABAPENTIN 600 MG: 300 CAPSULE ORAL at 09:07

## 2024-07-09 RX ADMIN — ASPIRIN 81 MG: 81 TABLET, COATED ORAL at 09:07

## 2024-07-09 RX ADMIN — LISINOPRIL 40 MG: 20 TABLET ORAL at 09:07

## 2024-07-09 RX ADMIN — VENLAFAXINE HYDROCHLORIDE 37.5 MG: 37.5 CAPSULE, EXTENDED RELEASE ORAL at 09:07

## 2024-07-09 RX ADMIN — ATORVASTATIN CALCIUM 40 MG: 40 TABLET, FILM COATED ORAL at 09:07

## 2024-07-09 RX ADMIN — ALLOPURINOL 100 MG: 100 TABLET ORAL at 09:07

## 2024-07-09 RX ADMIN — FENOFIBRATE 145 MG: 145 TABLET, COATED ORAL at 09:07

## 2024-07-09 RX ADMIN — SPIRONOLACTONE 25 MG: 25 TABLET ORAL at 09:07

## 2024-07-09 RX ADMIN — ISOSORBIDE MONONITRATE 30 MG: 30 TABLET, EXTENDED RELEASE ORAL at 09:07

## 2024-07-09 RX ADMIN — DAPAGLIFLOZIN 10 MG: 10 TABLET, FILM COATED ORAL at 09:07

## 2024-07-09 RX ADMIN — SENNOSIDES AND DOCUSATE SODIUM 1 TABLET: 50; 8.6 TABLET ORAL at 09:07

## 2024-07-09 NOTE — PLAN OF CARE
07/09/24 1239   Final Note   Assessment Type Final Discharge Note   Anticipated Discharge Disposition Home   Post-Acute Status   Discharge Delays None known at this time     Pt d/c with her son, to home.

## 2024-07-09 NOTE — PROGRESS NOTES
[facility] - ICU Hospital Medicine  Progress Note    Patient Name: Keara Berry  MRN: [id number]  Patient Class: IP- Inpatient   Admission Date: 7/6/2024  Length of Stay: 3 days  Attending Physician: Darnell Ramirez MD  Primary Care Provider: Cheryl Provider        Subjective:     Principal Problem:Acute on chronic systolic congestive heart failure    Interval History:   HPI: Ms Berry presented to the emergency room with significant respiratory distress. She has a history of sleep apnea on CPAP, congestive heart failure, COPD. She is not on home oxygen. Upon arrival to ER she was in significant respiratory distress and was placed on BiPAP. Workup revealed volume overload and she was given IV furosemide and has already diuresed 700 mL. At time of exam she was on BiPAP 12/6, 50% FiO2. She denies any chest pain or nausea. She does not smoke. Initial ABG done while in ER does show chronic respiratory acidosis and a secondary metabolic acidosis.    7/7-when seen this morning on rounds was much improved; at the time was on 2 L NC with good O2 saturations; she was able to provide additional history.  She and family members were traveling from [place] back to her home in [place].  Had noted increasing shortness of breath and lower extremity edema over the prior 2 days but symptoms became markedly worse to presentation here.  She admits compliance with medications however has had issues compliant with diet while traveling.  She does have a history of CHF and review of records shows prior admission 02/2021 at which time she underwent LHC, found to have mild-to-moderate disease and EF 35-40%.  She was placed on insulin drip protocol at time of admission and maintained on DKA protocol overnight, presumably due to hyperglycemia and increased anion gap metabolic acidosis on presentation.  Serum lactic acid and acetone levels were not performed.  Negative for ketones.  Blood sugars have come down,  metabolic acidosis is resolving.  It remains unclear as to whether patient was in DKA.  Downgrade to med/surge status    7/8-she has continued to do well; respiratory status is at baseline; O2 saturations on room air are normal; sugars are within normal range; I have encouraged her to ambulate in the room    July 7, 2024-no complaints, no fever, no shortness for breath, waiting for son who can bring her to [place]    Objective:     Vital Signs (Most Recent):  Temp: 97.7 °F (36.5 °C) (07/09/24 0732)  Pulse: 68 (07/09/24 0735)  Resp: 20 (07/09/24 0908)  BP: 132/69 (07/09/24 0732)  SpO2: 95 % (07/09/24 0735) Vital Signs (24h Range):  Temp:  [97.7 °F (36.5 °C)-98.3 °F (36.8 °C)] 97.7 °F (36.5 °C)  Pulse:  [62-96] 68  Resp:  [16-21] 20  SpO2:  [92 %-100 %] 95 %  BP: (130-150)/(64-74) 132/69     Weight: 118.7 kg (261 lb 9.6 oz)  Body mass index is 42.22 kg/m².    Intake/Output Summary (Last 24 hours) at 7/9/2024 0947  Last data filed at 7/9/2024 0551  Gross per 24 hour   Intake 720 ml   Output 3150 ml   Net -2430 ml      Physical exam  Constitution-obese, normally developed female in NAD  Eyes-PERRL, EOMI  HENT-normocephalic, atraumatic  Neck-supple  Respiratory-normal respirations; CTA with good air movement  Heart-RRR; normal S1 and S2; no murmurs, gallops  Abdomen-soft, nontender, nondistended  Genitourinary-deferred  Musculoskeletal-no joint abnormalities, normal ROM throughout; no edema  Skin-warm, dry; no rashes  Neurologic-alert and oriented x3; nonfocal exam    Scheduled Meds:   allopurinoL  100 mg Oral Daily    aspirin  81 mg Oral Daily    atorvastatin  40 mg Oral Daily    carvediloL  25 mg Oral BID    dapagliflozin propanediol  10 mg Oral Daily    enoxparin  30 mg Subcutaneous Daily    famotidine  20 mg Oral Daily    fenofibrate  145 mg Oral Daily    gabapentin  600 mg Oral BID    isosorbide mononitrate  30 mg Oral Daily    lisinopriL  40 mg Oral Daily    mupirocin   Nasal BID    senna-docusate 8.6-50 mg  1  "tablet Oral BID    spironolactone  25 mg Oral Daily    venlafaxine  37.5 mg Oral Daily     Continuous Infusions:  PRN Meds:.  Current Facility-Administered Medications:     acetaminophen, 650 mg, Oral, Q4H PRN    ALPRAZolam, 0.5 mg, Oral, BID PRN    D10W, , Intravenous, PRN    D10W, , Intravenous, PRN    dextrose 10%, 12.5 g, Intravenous, PRN    dextrose 10%, 25 g, Intravenous, PRN    dextrose 50%, 12.5 g, Intravenous, PRN    dextrose 50%, 25 g, Intravenous, PRN    glucagon (human recombinant), 1 mg, Intramuscular, PRN    glucose, 16 g, Oral, PRN    HYDROcodone-acetaminophen, 1 tablet, Oral, Q6H PRN    insulin aspart U-100, 0-10 Units, Subcutaneous, Q4H PRN    melatonin, 6 mg, Oral, Nightly PRN    naloxone, 0.02 mg, Intravenous, PRN    ondansetron, 4 mg, Intravenous, Q8H PRN    sodium chloride 0.9%, 10 mL, Intravenous, Q12H PRN    trazodone, 25 mg, Oral, Nightly PRN    Significant Labs: All pertinent labs within the past 24 hours have been reviewed.  A1C:   Recent Labs   Lab 07/08/24  0521   HGBA1C 9.0*     ABGs:   No results for input(s): "PH", "PCO2", "HCO3", "POCSATURATED", "BE", "TOTALHB", "COHB", "METHB", "O2HB", "POCFIO2", "PO2" in the last 48 hours.    CBC:   Recent Labs   Lab 07/08/24  0521   WBC 7.70   HGB 9.7*   HCT 30.8*        CMP:   Recent Labs   Lab 07/07/24  1254 07/08/24  0521 07/09/24  0454    141 140   K 3.5 3.7 3.9    104 102   CO2 27 29 30   BUN 25.0* 31.0* 31.0*   CREATININE 1.73* 1.55* 1.53*   CALCIUM 9.6 9.2 9.5     Magnesium:   Recent Labs   Lab 07/08/24  0521 07/09/24  0454   MG 2.20 2.10     POCT Glucose:   Recent Labs   Lab 07/08/24  1137 07/08/24  1639 07/08/24  2037   POCTGLUCOSE 183* 171* 226*     Troponin:   Recent Labs   Lab 07/07/24  1254   TROPONINI 0.160*     Urine Studies:   No results for input(s): "COLORU", "APPEARANCEUA", "PHUR", "SPECGRAV", "PROTEINUA", "GLUCUA", "KETONESU", "BILIRUBINUA", "OCCULTUA", "NITRITE", "UROBILINOGEN", "LEUKOCYTESUR", "RBCUA", " ""WBCUA", "BACTERIA", "SQUAMEPITHEL", "HYALINECASTS" in the last 48 hours.    Invalid input(s): "WRIGHTSUR"      Significant Imaging:   CXR  Impression:  Right hilar mass with bilateral pulmonary infiltrates.  Further assessment with chest CT recommended.    CT chest  Impression:  1. Patchy ground-glass and nodular opacities in the right upper lobe which may reflect edema or a small airways infectious or inflammatory process.  2. Interlobular septal thickening in both lungs compatible with pulmonary edema.  3. Bilateral small pleural effusions.  Dependent opacities in both lower lobes likely reflect atelectasis with infection not excluded.  4. Status post cholecystectomy.  Small hyperattenuating focus at the hepatic hilum within area of fluid attenuation could reflect a stone within the cystic duct remnant.    Assessment/Plan:   Acute hypoxemic respiratory failure due to CHF  Resolved    Acute on chronicHFrEF  Resolved patient clinically euvolemic at this time  Continue beta-blocker, ACE inhibitor; add SGLT2 inhibitor (Farxiga)  Add Spironolactone  Will give additional dose of IV furosemide this am    Metabolic acidosis, elevated AG  Etiology unconfirmed; differential includes DKA, lactic acidosis, FREIDA  Resolved    Diabetes mellitus, hyperglycemia  Blood sugars nl  Cont current approach  Monitor blood sugars with Accu-Cheks and SSI    Acute kidney injury/probable CKD, unspecified  Patient's baseline is unknown  Cr has cont'd to trend down; slightly more azotemic today, poss due to diuresis  Continue to monitor; avoid nephrotoxic agents as possible    Elevated troponin  Type 2 MI related to decompensated heart failure, hypoxemia    Obstructive sleep apnea  Patient admits compliance with CPAP  Continue CPAP/BiPAP nightly while in hospital    Right hilar lung mass on CXR  CT chest shows no lung mass    Hypertension  Meds as noted above have been resumed/continued  Have held amlodipine and hydralazine for now  BP remains " acceptable    Obesity    History of gout  Continue allopurinol    Dyslipidemia  Continue statin    GI prophylaxis:  Famotidine    Code status: Full    Discharge plan-possible discharge home today if son can pick her up to go to [place]  Active Diagnoses:    Diagnosis Date Noted POA    PRINCIPAL PROBLEM:  Acute on chronic systolic congestive heart failure [I50.23] 07/07/2024 Yes      Problems Resolved During this Admission:     VTE Risk Mitigation (From admission, onward)           Ordered     enoxaparin injection 30 mg  Daily         07/06/24 2317     IP VTE HIGH RISK PATIENT  Once         07/06/24 2317     Place sequential compression device  Until discontinued         07/06/24 2212                  Patient screened for social drivers of health:  Housing instability  Transportation needs  Utility difficulties  Interpersonal safety  ---no needs identified    Bar Go MD  Department of Hospital Medicine   [facility] - ICU

## 2024-07-09 NOTE — PLAN OF CARE
Problem: Adult Inpatient Plan of Care  Goal: Plan of Care Review  Outcome: Met  Goal: Patient-Specific Goal (Individualized)  Outcome: Met  Goal: Absence of Hospital-Acquired Illness or Injury  Outcome: Met  Goal: Optimal Comfort and Wellbeing  Outcome: Met  Goal: Readiness for Transition of Care  Outcome: Met     Problem: Skin Injury Risk Increased  Goal: Skin Health and Integrity  Outcome: Met     Problem: Bariatric Environmental Safety  Goal: Safety Maintained with Care  Outcome: Met     Problem: Comorbidity Management  Goal: Maintenance of COPD Symptom Control  Outcome: Met  Goal: Maintenance of Heart Failure Symptom Control  Outcome: Met  Goal: Blood Pressure in Desired Range  Outcome: Met     Problem: Gas Exchange Impaired  Goal: Optimal Gas Exchange  Outcome: Met     Problem: Diabetes Comorbidity  Goal: Blood Glucose Level Within Targeted Range  Outcome: Met     Problem: Heart Failure  Goal: Optimal Coping  Outcome: Met  Goal: Optimal Cardiac Output  Outcome: Met  Goal: Stable Heart Rate and Rhythm  Outcome: Met  Goal: Optimal Functional Ability  Outcome: Met  Goal: Fluid and Electrolyte Balance  Outcome: Met  Goal: Improved Oral Intake  Outcome: Met  Goal: Effective Oxygenation and Ventilation  Outcome: Met  Goal: Effective Breathing Pattern During Sleep  Outcome: Met     Problem: Chronic Kidney Disease  Goal: Optimal Coping with Chronic Illness  Outcome: Met  Goal: Electrolyte Balance  Outcome: Met  Goal: Fluid Balance  Outcome: Met  Goal: Optimal Functional Ability  Outcome: Met  Goal: Absence of Anemia Signs and Symptoms  Outcome: Met  Goal: Optimal Oral Intake  Outcome: Met  Goal: Acceptable Pain Control  Outcome: Met  Goal: Minimize Renal Failure Effects  Outcome: Met     Problem: Diabetic Ketoacidosis  Goal: Optimal Coping  Outcome: Met  Goal: Fluid and Electrolyte Balance with Absence of Ketosis  Outcome: Met     Problem: Obstructive Sleep Apnea Risk or Actual  Goal: Unobstructed Breathing During  Sleep  Outcome: Met

## 2024-07-09 NOTE — PLAN OF CARE
Problem: Adult Inpatient Plan of Care  Goal: Plan of Care Review  Outcome: Progressing  Goal: Patient-Specific Goal (Individualized)  Outcome: Progressing  Goal: Absence of Hospital-Acquired Illness or Injury  Outcome: Progressing  Goal: Optimal Comfort and Wellbeing  Outcome: Progressing  Goal: Readiness for Transition of Care  Outcome: Progressing     Problem: Skin Injury Risk Increased  Goal: Skin Health and Integrity  Outcome: Progressing     Problem: Bariatric Environmental Safety  Goal: Safety Maintained with Care  Outcome: Progressing     Problem: Diabetes Comorbidity  Goal: Blood Glucose Level Within Targeted Range  Outcome: Progressing     Problem: Heart Failure  Goal: Optimal Coping  Outcome: Progressing  Goal: Optimal Cardiac Output  Outcome: Progressing  Goal: Stable Heart Rate and Rhythm  Outcome: Progressing  Goal: Optimal Functional Ability  Outcome: Progressing  Goal: Fluid and Electrolyte Balance  Outcome: Progressing  Goal: Improved Oral Intake  Outcome: Progressing  Goal: Effective Oxygenation and Ventilation  Outcome: Progressing  Goal: Effective Breathing Pattern During Sleep  Outcome: Progressing     Problem: Chronic Kidney Disease  Goal: Optimal Coping with Chronic Illness  Outcome: Progressing  Goal: Electrolyte Balance  Outcome: Progressing  Goal: Fluid Balance  Outcome: Progressing  Goal: Optimal Functional Ability  Outcome: Progressing  Goal: Absence of Anemia Signs and Symptoms  Outcome: Progressing  Goal: Optimal Oral Intake  Outcome: Progressing  Goal: Acceptable Pain Control  Outcome: Progressing  Goal: Minimize Renal Failure Effects  Outcome: Progressing

## 2024-07-09 NOTE — DISCHARGE SUMMARY
[facility] - ICU Hospital Medicine  Discharge summary    Patient Name: Keara Berry  MRN: [id number]  Patient Class: IP- Inpatient   Admission Date: 7/6/2024  Length of Stay: 3 days  Attending Physician: Darnell Ramirez MD  Primary Care Provider: Cheryl Provider        Subjective:     Principal Problem:Acute on chronic systolic congestive heart failure    Interval History:   HPI: Ms Berry presented to the emergency room with significant respiratory distress. She has a history of sleep apnea on CPAP, congestive heart failure, COPD. She is not on home oxygen. Upon arrival to ER she was in significant respiratory distress and was placed on BiPAP. Workup revealed volume overload and she was given IV furosemide and has already diuresed 700 mL. At time of exam she was on BiPAP 12/6, 50% FiO2. She denies any chest pain or nausea. She does not smoke. Initial ABG done while in ER does show chronic respiratory acidosis and a secondary metabolic acidosis.    7/7-when seen this morning on rounds was much improved; at the time was on 2 L NC with good O2 saturations; she was able to provide additional history.  She and family members were traveling from [place] back to her home in [place].  Had noted increasing shortness of breath and lower extremity edema over the prior 2 days but symptoms became markedly worse to presentation here.  She admits compliance with medications however has had issues compliant with diet while traveling.  She does have a history of CHF and review of records shows prior admission 02/2021 at which time she underwent LHC, found to have mild-to-moderate disease and EF 35-40%.  She was placed on insulin drip protocol at time of admission and maintained on DKA protocol overnight, presumably due to hyperglycemia and increased anion gap metabolic acidosis on presentation.  Serum lactic acid and acetone levels were not performed.  Negative for ketones.  Blood sugars have come down,  metabolic acidosis is resolving.  It remains unclear as to whether patient was in DKA.  Downgrade to med/surge status    7/8-she has continued to do well; respiratory status is at baseline; O2 saturations on room air are normal; sugars are within normal range; I have encouraged her to ambulate in the room    July 9, 2024-no complaints, no fever, no shortness for breath, waiting for son who can bring her to [place]    Son arrived, patient can go home with the son    Objective:     Vital Signs (Most Recent):  Temp: 97.7 °F (36.5 °C) (07/09/24 0732)  Pulse: 62 (07/09/24 1117)  Resp: 20 (07/09/24 0908)  BP: 137/69 (07/09/24 1117)  SpO2: (!) 91 % (07/09/24 1117) Vital Signs (24h Range):  Temp:  [97.7 °F (36.5 °C)-98.3 °F (36.8 °C)] 97.7 °F (36.5 °C)  Pulse:  [62-96] 62  Resp:  [16-21] 20  SpO2:  [91 %-100 %] 91 %  BP: (130-150)/(64-74) 137/69     Weight: 118.7 kg (261 lb 9.6 oz)  Body mass index is 42.22 kg/m².    Intake/Output Summary (Last 24 hours) at 7/9/2024 1217  Last data filed at 7/9/2024 0551  Gross per 24 hour   Intake 360 ml   Output 2400 ml   Net -2040 ml      Physical exam  Constitution-obese, normally developed female in NAD  Eyes-PERRL, EOMI  HENT-normocephalic, atraumatic  Neck-supple  Respiratory-normal respirations; CTA with good air movement  Heart-RRR; normal S1 and S2; no murmurs, gallops  Abdomen-soft, nontender, nondistended  Genitourinary-deferred  Musculoskeletal-no joint abnormalities, normal ROM throughout; no edema  Skin-warm, dry; no rashes  Neurologic-alert and oriented x3; nonfocal exam    Scheduled Meds:   allopurinoL  100 mg Oral Daily    aspirin  81 mg Oral Daily    atorvastatin  40 mg Oral Daily    carvediloL  25 mg Oral BID    dapagliflozin propanediol  10 mg Oral Daily    enoxparin  30 mg Subcutaneous Daily    famotidine  20 mg Oral Daily    fenofibrate  145 mg Oral Daily    gabapentin  600 mg Oral BID    isosorbide mononitrate  30 mg Oral Daily    lisinopriL  40 mg Oral Daily     "mupirocin   Nasal BID    senna-docusate 8.6-50 mg  1 tablet Oral BID    spironolactone  25 mg Oral Daily    venlafaxine  37.5 mg Oral Daily     Continuous Infusions:  PRN Meds:.  Current Facility-Administered Medications:     acetaminophen, 650 mg, Oral, Q4H PRN    ALPRAZolam, 0.5 mg, Oral, BID PRN    D10W, , Intravenous, PRN    D10W, , Intravenous, PRN    dextrose 10%, 12.5 g, Intravenous, PRN    dextrose 10%, 25 g, Intravenous, PRN    dextrose 50%, 12.5 g, Intravenous, PRN    dextrose 50%, 25 g, Intravenous, PRN    glucagon (human recombinant), 1 mg, Intramuscular, PRN    glucose, 16 g, Oral, PRN    HYDROcodone-acetaminophen, 1 tablet, Oral, Q6H PRN    insulin aspart U-100, 0-10 Units, Subcutaneous, Q4H PRN    melatonin, 6 mg, Oral, Nightly PRN    naloxone, 0.02 mg, Intravenous, PRN    ondansetron, 4 mg, Intravenous, Q8H PRN    sodium chloride 0.9%, 10 mL, Intravenous, Q12H PRN    trazodone, 25 mg, Oral, Nightly PRN    Significant Labs: All pertinent labs within the past 24 hours have been reviewed.  A1C:   Recent Labs   Lab 07/08/24  0521   HGBA1C 9.0*     ABGs:   No results for input(s): "PH", "PCO2", "HCO3", "POCSATURATED", "BE", "TOTALHB", "COHB", "METHB", "O2HB", "POCFIO2", "PO2" in the last 48 hours.    CBC:   Recent Labs   Lab 07/08/24  0521   WBC 7.70   HGB 9.7*   HCT 30.8*        CMP:   Recent Labs   Lab 07/07/24  1254 07/08/24  0521 07/09/24  0454    141 140   K 3.5 3.7 3.9    104 102   CO2 27 29 30   BUN 25.0* 31.0* 31.0*   CREATININE 1.73* 1.55* 1.53*   CALCIUM 9.6 9.2 9.5     Magnesium:   Recent Labs   Lab 07/08/24  0521 07/09/24  0454   MG 2.20 2.10     POCT Glucose:   Recent Labs   Lab 07/08/24  1639 07/08/24  2037 07/09/24  0613   POCTGLUCOSE 171* 226* 175*     Troponin:   Recent Labs   Lab 07/07/24  1254   TROPONINI 0.160*     Urine Studies:   No results for input(s): "COLORU", "APPEARANCEUA", "PHUR", "SPECGRAV", "PROTEINUA", "GLUCUA", "KETONESU", "BILIRUBINUA", "OCCULTUA", " ""NITRITE", "UROBILINOGEN", "LEUKOCYTESUR", "RBCUA", "WBCUA", "BACTERIA", "SQUAMEPITHEL", "HYALINECASTS" in the last 48 hours.    Invalid input(s): "WRIGHTSUR"      Significant Imaging:   CXR  Impression:  Right hilar mass with bilateral pulmonary infiltrates.  Further assessment with chest CT recommended.    CT chest  Impression:  1. Patchy ground-glass and nodular opacities in the right upper lobe which may reflect edema or a small airways infectious or inflammatory process.  2. Interlobular septal thickening in both lungs compatible with pulmonary edema.  3. Bilateral small pleural effusions.  Dependent opacities in both lower lobes likely reflect atelectasis with infection not excluded.  4. Status post cholecystectomy.  Small hyperattenuating focus at the hepatic hilum within area of fluid attenuation could reflect a stone within the cystic duct remnant.    Assessment/Plan:     Discharge diagnosis  Acute hypoxemic respiratory failure due to CHF  Acute on chronic systolic CHF exacerbation  Ejection fraction 35%  Metabolic acidosis   Diabetes   Acute on chronic renal failure--the condition is confirmed   Obstructive sleep apnea   Right hilar mass on chest x-ray  Hypertension   Obesity       Discharge condition-stable     Discharge destination-home-discharge time 30 minutes    Active Diagnoses:    Diagnosis Date Noted POA    PRINCIPAL PROBLEM:  Acute on chronic systolic congestive heart failure [I50.23] 07/07/2024 Yes      Problems Resolved During this Admission:     VTE Risk Mitigation (From admission, onward)           Ordered     enoxaparin injection 30 mg  Daily         07/06/24 2317     IP VTE HIGH RISK PATIENT  Once         07/06/24 2317     Place sequential compression device  Until discontinued         07/06/24 7382                  Patient screened for social drivers of health:  Housing instability  Transportation needs  Utility difficulties  Interpersonal safety  ---no needs identified    Bar Go, " MD  Department of Hospital Medicine   [facility]